# Patient Record
Sex: MALE | Race: BLACK OR AFRICAN AMERICAN | NOT HISPANIC OR LATINO | Employment: OTHER | URBAN - METROPOLITAN AREA
[De-identification: names, ages, dates, MRNs, and addresses within clinical notes are randomized per-mention and may not be internally consistent; named-entity substitution may affect disease eponyms.]

---

## 2017-02-03 ENCOUNTER — HOSPITAL ENCOUNTER (EMERGENCY)
Facility: HOSPITAL | Age: 52
Discharge: HOME | End: 2017-02-03
Attending: EMERGENCY MEDICINE
Payer: MEDICARE

## 2017-02-03 VITALS
BODY MASS INDEX: 30.75 KG/M2 | DIASTOLIC BLOOD PRESSURE: 83 MMHG | OXYGEN SATURATION: 100 % | SYSTOLIC BLOOD PRESSURE: 152 MMHG | WEIGHT: 227 LBS | RESPIRATION RATE: 19 BRPM | HEIGHT: 72 IN | TEMPERATURE: 98 F | HEART RATE: 73 BPM

## 2017-02-03 DIAGNOSIS — R53.1 WEAKNESS: ICD-10-CM

## 2017-02-03 DIAGNOSIS — R41.82 ALTERED MENTAL STATUS, UNSPECIFIED: Primary | ICD-10-CM

## 2017-02-03 LAB
ALBUMIN SERPL BCP-MCNC: 3.5 G/DL
ALP SERPL-CCNC: 59 U/L
ALT SERPL W/O P-5'-P-CCNC: 19 U/L
AMPHET+METHAMPHET UR QL: NEGATIVE
ANION GAP SERPL CALC-SCNC: 9 MMOL/L
AST SERPL-CCNC: 18 U/L
BARBITURATES UR QL SCN>200 NG/ML: NEGATIVE
BASOPHILS # BLD AUTO: 0.06 K/UL
BASOPHILS NFR BLD: 0.7 %
BENZODIAZ UR QL SCN>200 NG/ML: NEGATIVE
BILIRUB SERPL-MCNC: 0.4 MG/DL
BILIRUB UR QL STRIP: NEGATIVE
BNP SERPL-MCNC: <10 PG/ML
BUN SERPL-MCNC: 12 MG/DL
BZE UR QL SCN: NEGATIVE
CALCIUM SERPL-MCNC: 9.3 MG/DL
CANNABINOIDS UR QL SCN: NEGATIVE
CHLORIDE SERPL-SCNC: 104 MMOL/L
CLARITY UR REFRACT.AUTO: CLEAR
CO2 SERPL-SCNC: 27 MMOL/L
COLOR UR AUTO: YELLOW
CREAT SERPL-MCNC: 1.5 MG/DL
CREAT UR-MCNC: 213 MG/DL
DIFFERENTIAL METHOD: ABNORMAL
EOSINOPHIL # BLD AUTO: 0.1 K/UL
EOSINOPHIL NFR BLD: 0.6 %
ERYTHROCYTE [DISTWIDTH] IN BLOOD BY AUTOMATED COUNT: 15.9 %
EST. GFR  (AFRICAN AMERICAN): >60 ML/MIN/1.73 M^2
EST. GFR  (NON AFRICAN AMERICAN): 53.5 ML/MIN/1.73 M^2
ETHANOL SERPL-MCNC: <10 MG/DL
GLUCOSE SERPL-MCNC: 106 MG/DL
GLUCOSE UR QL STRIP: NEGATIVE
HCT VFR BLD AUTO: 38.7 %
HGB BLD-MCNC: 13.5 G/DL
HGB UR QL STRIP: NEGATIVE
INR PPP: 1
KETONES UR QL STRIP: NEGATIVE
LEUKOCYTE ESTERASE UR QL STRIP: NEGATIVE
LYMPHOCYTES # BLD AUTO: 1.8 K/UL
LYMPHOCYTES NFR BLD: 20.5 %
MCH RBC QN AUTO: 24.2 PG
MCHC RBC AUTO-ENTMCNC: 34.9 %
MCV RBC AUTO: 70 FL
METHADONE UR QL SCN>300 NG/ML: NEGATIVE
MONOCYTES # BLD AUTO: 0.6 K/UL
MONOCYTES NFR BLD: 6.6 %
NEUTROPHILS # BLD AUTO: 6.2 K/UL
NEUTROPHILS NFR BLD: 71.4 %
NITRITE UR QL STRIP: NEGATIVE
OPIATES UR QL SCN: NEGATIVE
PCP UR QL SCN>25 NG/ML: NEGATIVE
PH UR STRIP: 6 [PH] (ref 5–8)
PLATELET # BLD AUTO: 274 K/UL
PMV BLD AUTO: 10.1 FL
POCT GLUCOSE: 162 MG/DL (ref 70–110)
POTASSIUM SERPL-SCNC: 3.8 MMOL/L
PROT SERPL-MCNC: 7.3 G/DL
PROT UR QL STRIP: NEGATIVE
PROTHROMBIN TIME: 10.8 SEC
RBC # BLD AUTO: 5.57 M/UL
SODIUM SERPL-SCNC: 140 MMOL/L
SP GR UR STRIP: 1.02 (ref 1–1.03)
T4 FREE SERPL-MCNC: 0.97 NG/DL
TOXICOLOGY INFORMATION: NORMAL
TROPONIN I SERPL DL<=0.01 NG/ML-MCNC: 0.01 NG/ML
TSH SERPL DL<=0.005 MIU/L-ACNC: <0.01 UIU/ML
URN SPEC COLLECT METH UR: NORMAL
UROBILINOGEN UR STRIP-ACNC: 1 EU/DL
WBC # BLD AUTO: 8.65 K/UL

## 2017-02-03 PROCEDURE — 82962 GLUCOSE BLOOD TEST: CPT

## 2017-02-03 PROCEDURE — 82530 CORTISOL FREE: CPT

## 2017-02-03 PROCEDURE — 85610 PROTHROMBIN TIME: CPT

## 2017-02-03 PROCEDURE — 84439 ASSAY OF FREE THYROXINE: CPT

## 2017-02-03 PROCEDURE — 84484 ASSAY OF TROPONIN QUANT: CPT

## 2017-02-03 PROCEDURE — 80053 COMPREHEN METABOLIC PANEL: CPT

## 2017-02-03 PROCEDURE — 93005 ELECTROCARDIOGRAM TRACING: CPT

## 2017-02-03 PROCEDURE — 80320 DRUG SCREEN QUANTALCOHOLS: CPT

## 2017-02-03 PROCEDURE — 84443 ASSAY THYROID STIM HORMONE: CPT

## 2017-02-03 PROCEDURE — 83880 ASSAY OF NATRIURETIC PEPTIDE: CPT

## 2017-02-03 PROCEDURE — 99284 EMERGENCY DEPT VISIT MOD MDM: CPT | Mod: ,,, | Performed by: EMERGENCY MEDICINE

## 2017-02-03 PROCEDURE — 93010 ELECTROCARDIOGRAM REPORT: CPT | Mod: ,,, | Performed by: INTERNAL MEDICINE

## 2017-02-03 PROCEDURE — 96360 HYDRATION IV INFUSION INIT: CPT

## 2017-02-03 PROCEDURE — 25000003 PHARM REV CODE 250: Performed by: EMERGENCY MEDICINE

## 2017-02-03 PROCEDURE — 85025 COMPLETE CBC W/AUTO DIFF WBC: CPT

## 2017-02-03 PROCEDURE — 81003 URINALYSIS AUTO W/O SCOPE: CPT

## 2017-02-03 PROCEDURE — 82570 ASSAY OF URINE CREATININE: CPT

## 2017-02-03 PROCEDURE — 99284 EMERGENCY DEPT VISIT MOD MDM: CPT | Mod: 25

## 2017-02-03 RX ORDER — LEVOTHYROXINE SODIUM 25 UG/1
25 TABLET ORAL DAILY
COMMUNITY
End: 2017-08-16 | Stop reason: SDUPTHER

## 2017-02-03 RX ORDER — HYDROCORTISONE 5 MG/1
5 TABLET ORAL DAILY
COMMUNITY
End: 2017-08-16 | Stop reason: SDUPTHER

## 2017-02-03 RX ORDER — FLUOXETINE HYDROCHLORIDE 40 MG/1
40 CAPSULE ORAL DAILY
COMMUNITY
End: 2017-02-07 | Stop reason: CLARIF

## 2017-02-03 RX ORDER — QUETIAPINE FUMARATE 400 MG/1
400 TABLET, FILM COATED ORAL NIGHTLY
COMMUNITY

## 2017-02-03 RX ORDER — TESTOSTERONE CYPIONATE 200 MG/ML
INJECTION, SOLUTION INTRAMUSCULAR
COMMUNITY
End: 2017-06-20 | Stop reason: SDUPTHER

## 2017-02-03 RX ORDER — QUETIAPINE FUMARATE 200 MG/1
TABLET, FILM COATED ORAL
COMMUNITY
End: 2017-02-07 | Stop reason: SDUPTHER

## 2017-02-03 RX ORDER — OMEPRAZOLE 40 MG/1
40 CAPSULE, DELAYED RELEASE ORAL DAILY
COMMUNITY
End: 2017-02-05

## 2017-02-03 RX ORDER — LAMOTRIGINE 25 MG/1
25 TABLET ORAL DAILY
COMMUNITY
End: 2017-08-15 | Stop reason: SDUPTHER

## 2017-02-03 RX ORDER — SILDENAFIL 25 MG/1
25 TABLET, FILM COATED ORAL DAILY PRN
COMMUNITY
End: 2017-06-21

## 2017-02-03 RX ORDER — DESMOPRESSIN ACETATE 0.1 MG/1
50 TABLET ORAL 2 TIMES DAILY
COMMUNITY
End: 2017-08-15 | Stop reason: SDUPTHER

## 2017-02-03 RX ADMIN — SODIUM CHLORIDE 500 ML: 0.9 INJECTION, SOLUTION INTRAVENOUS at 02:02

## 2017-02-03 NOTE — ED PROVIDER NOTES
"Encounter Date: 2/3/2017    SCRIBE #1 NOTE: I, Magda Danyell, am scribing for, and in the presence of, Dr. Brian.       History     Chief Complaint   Patient presents with    Weakness     patient states that he do not feel right     Dizziness     Review of patient's allergies indicates:   Allergen Reactions    Sulfa (sulfonamide antibiotics) Hives     HPI Comments: Time seen by provider: 1:50 PM    This is a 51 y.o. male with a PMHx of thyroid disease and HTN who presents with complaint of "feeling out of it". The patient reports a history of adrenal problems and smoking. He states over the past 3 days he has been feeling strange, tired, and "like a zombie". The patient states he has been taking all of his medications as directed and that they have not been changed recently. He denies injury or trauma. He states he has had these symptoms in the past and received an stroke work up that was negative at that time. He reports worsening of these symptoms today which is what prompted his visit to the ED. He describes feeling weird and not entirely "there" at times, forgetting tasks that his doing of has recently done. He denies any focal or asymmetrical weakness.     The history is provided by the patient and the spouse.     Past Medical History   Diagnosis Date    Depression     Hypertension     Thyroid disease      No past medical history pertinent negatives.  Past Surgical History   Procedure Laterality Date    Pituitary surgery       History reviewed. No pertinent family history.  Social History   Substance Use Topics    Smoking status: None    Smokeless tobacco: None    Alcohol use None     Review of Systems   Constitutional: Negative for chills and fever.   HENT: Negative for congestion, dental problem, facial swelling and mouth sores.    Eyes: Negative for visual disturbance.   Respiratory: Negative for cough and shortness of breath.    Cardiovascular: Negative for chest pain and palpitations. "   Gastrointestinal: Negative for abdominal distention, abdominal pain, diarrhea, nausea and vomiting.   Genitourinary: Negative for difficulty urinating, dysuria, hematuria and urgency.   Musculoskeletal: Negative for neck pain and neck stiffness.   Skin: Negative for rash.   Neurological: Positive for weakness and light-headedness. Negative for seizures, syncope, facial asymmetry and speech difficulty.     All systems were reviewed and were negative except as noted in the HPI.    Physical Exam   Initial Vitals   BP Pulse Resp Temp SpO2   02/03/17 1243 02/03/17 1243 02/03/17 1243 02/03/17 1243 02/03/17 1243   192/104 87 18 98.4 °F (36.9 °C) 95 %       Physical Exam    Nursing note and vitals reviewed.  Constitutional: He appears well-developed and well-nourished. No distress.   HENT:   Head: Normocephalic.   Mouth/Throat: Oropharynx is clear and moist.   Eyes: Conjunctivae are normal.   Neck: Normal range of motion. Neck supple.   Cardiovascular: Normal rate, regular rhythm and intact distal pulses.   Pulmonary/Chest: Breath sounds normal. No respiratory distress. He has no wheezes. He has no rhonchi. He has no rales.   Abdominal: Soft. There is no tenderness.   Musculoskeletal: Normal range of motion.   Neurological: He is alert and oriented to person, place, and time. He has normal strength.   Skin: Skin is warm and dry.   Psychiatric: Thought content normal.     Neuro: awake, alert, oriented, CNs intact, fundi w/o papilledema, motor, sensory, and coordination intact in 4 extremities        ED Course   Procedures  Labs Reviewed   CBC W/ AUTO DIFFERENTIAL - Abnormal; Notable for the following:        Result Value    Hemoglobin 13.5 (*)     Hematocrit 38.7 (*)     MCV 70 (*)     MCH 24.2 (*)     RDW 15.9 (*)     All other components within normal limits    Narrative:     PLEASE REVIEW ORDER START TIME BEFORE MARKING SPECIMEN  COLLECTED.   COMPREHENSIVE METABOLIC PANEL - Abnormal; Notable for the following:      Creatinine 1.5 (*)     eGFR if non  53.5 (*)     All other components within normal limits    Narrative:     PLEASE REVIEW ORDER START TIME BEFORE MARKING SPECIMEN  COLLECTED.   TSH - Abnormal; Notable for the following:     TSH <0.010 (*)     All other components within normal limits    Narrative:     PLEASE REVIEW ORDER START TIME BEFORE MARKING SPECIMEN  COLLECTED.   POCT GLUCOSE - Abnormal; Notable for the following:     POCT Glucose 162 (*)     All other components within normal limits   PROTIME-INR    Narrative:     PLEASE REVIEW ORDER START TIME BEFORE MARKING SPECIMEN  COLLECTED.   TROPONIN I    Narrative:     PLEASE REVIEW ORDER START TIME BEFORE MARKING SPECIMEN  COLLECTED.   B-TYPE NATRIURETIC PEPTIDE    Narrative:     PLEASE REVIEW ORDER START TIME BEFORE MARKING SPECIMEN  COLLECTED.   ALCOHOL,MEDICAL (ETHANOL)    Narrative:     PLEASE REVIEW ORDER START TIME BEFORE MARKING SPECIMEN  COLLECTED.   URINALYSIS    Narrative:     1 cup of urine   DRUG SCREEN PANEL, URINE EMERGENCY    Narrative:     1 cup of urine   T4, FREE    Narrative:     PLEASE REVIEW ORDER START TIME BEFORE MARKING SPECIMEN  COLLECTED.   CORTISOL, FREE, SERUM   TROPONIN I   POCT GLUCOSE MONITORING CONTINUOUS     EKG Readings: (Independently Interpreted)   Heart Rate: 83.   Normal sinus rhythm. No obvious ischemia, QTC of 420       X-Rays:   Independently Interpreted Readings:   Chest X-Ray: No acute process.   Head CT: No obvious acute findings. There was question about a small area of induration by right frontal skull but he has no history of trauma.      Medical Decision Making:    I reviewed and interpreted the ECG and any monitoring strips.  I reviewed radiology personally along with interpretations.  I reviewed and interpreted the laboratory results.    Gerald Brian MD, LAMONTE, CPE, FACEP  Department of Emergency Medicine  , [employer] [employer]        Medical Decision  Making:   History:   Old Medical Records: I decided to obtain old medical records.  Initial Assessment:   The patient remained non focal on exam, he is speaking and walking normally and has no emergent indications for admission. He was given instructions to follow up with neurology and internal medicine and was give strict return precautions.   Independently Interpreted Test(s):   I have ordered and independently interpreted X-rays - see prior notes.  I have ordered and independently interpreted EKG Reading(s) - see prior notes  Clinical Tests:   Lab Tests: Ordered and Reviewed       <> Summary of Lab: Patient's labs: Glucose 162; TSH low; T4 normal; drug screen negative; UA not consistent with UTI; Alcohol negative; BNP normal; Troponin normal; INR normal; Creatinine 1.5, consistent with known history of mild renal insufficiency; CBC no critical findings.   Radiological Study: Ordered and Reviewed  Medical Tests: Ordered and Reviewed            Scribe Attestation:   Scribe #1: I performed the above scribed service and the documentation accurately describes the services I performed. I attest to the accuracy of the note.    Attending Attestation:           Physician Attestation for Scribe:  Physician Attestation Statement for Scribe #1: I, Dr. Brian, reviewed documentation, as scribed by Magda Child in my presence, and it is both accurate and complete.                 ED Course     Clinical Impression:   The primary encounter diagnosis was Altered mental status, unspecified. A diagnosis of Weakness was also pertinent to this visit.    Disposition:   Disposition: Discharged  Condition: Stable    Gerald Brian MD, LAMONTE, CPE, FACEP  Department of Emergency Medicine  , [employer] [employer]       Bryan Brian MD  02/04/17 8109

## 2017-02-03 NOTE — ED NOTES
LOC: The patient is awake and alert; oriented x 3 Feels week  APPEARANCE: Patient resting comfortably, patient is clean and well groomed  SKIN: warm and dry, normal skin turgor & moist mucus membranes, skin intact, no breakdown noted.  MUSCULOSKELETAL: Patient moving all extremities well, no obvious swelling or deformities noted  RESPIRATORY: Airway is open and patent, breath sounds clear throughout all lung fields; respirations are spontaneous, normal effort and rate  CARDIAC: Patient has a normal rate, no peripheral edema noted, capillary refill < 3 seconds; No complaints of chest pain   ABDOMEN: Soft and non tender to palpation, no distention noted.

## 2017-02-03 NOTE — DISCHARGE INSTRUCTIONS
Weakness (Uncertain Cause)  Normal head CT, EKG, Chest xray and blood and urine tests  Creatinine (1.5) kidney function was not significantly altered   Based on your exam today, the exact cause of your weakness is not certain. However, your weakness does not seem to be a sign of a serious illness at this time. Keep an eye on your symptoms and get medical advice as instructed below.  Home care  · Rest at home today. Do not over-exert yourself.  · Take any medicine as prescribed.  · For the next few days, drink extra fluids (unless your healthcare provider wants you to restrict fluids for other reasons). Do not skip meals.  Follow-up care  Follow up with your healthcare provider or as advised.  When to seek medical advice  Call your healthcare provider for any of the following  · Worsening of your symptoms  · Symptoms don't start getting better within 2 days  · Fever of 100.4º F (38º C) or higher, or as directed by your healthcare provider·    Call 911  Get emergency medical care for any of these:  · Chest, arm, neck, jaw or upper back pain  · Trouble breathing  · Numbness or weakness of the face, one arm or one leg  · Slurred speech, confusion, trouble speaking, walking or seeing  · Blood in vomit or stool (black or red color)  · Loss of consciousness  Date Last Reviewed: 6/10/2015  © 4838-3354 [employer]. [address] [place] [address], [place] [address]. All rights reserved. This information is not intended as a substitute for professional medical care. Always follow your healthcare professional's instructions.

## 2017-02-03 NOTE — ED AVS SNAPSHOT
[facility]  [address]  [facility] [address]               Alcon Marsh   2/3/2017  1:08 PM   ED    Description:  Male : 1965   Department:  [facility]           Your Care was Coordinated By:     Provider Role From To    Bryan Brian MD Attending Provider 17 1341 17 1350    Bryan Brian MD Attending Provider 17 1353 --    Marina Mcclelland PA-C Physician Assistant 17 1308 17 1350    Jose Elias Tovar MD Resident 17 1355 17 1405      Reason for Visit     Weakness     Dizziness           Diagnoses this Visit        Comments    Altered mental status, unspecified    -  Primary     Weakness           ED Disposition     None           To Do List           Follow-up Information     Schedule an appointment as soon as possible for a visit with Dwight Kishan - Internal Medicine.    Specialty:  Internal Medicine    Contact information:    [address]  [facility] [id number] [phone]    Additional information:    [facility] [address]        Schedule an appointment as soon as possible for a visit with Dwight Holley - Neurology.    Specialty:  Neurology    Contact information:    [address]  [facility] [address] [phone]    Additional information:    7th Floor - Clinic [facility]     [facility] - 24/7 Assistance  Registered nurses in the [facility] provide clinical advisement, health education, appointment booking, and other advisory services.  Call for this free service at [phone].             Medications           Message regarding Medications     Verify the changes and/or additions to your medication regime listed below are the same as discussed with your clinician today.  If any of these changes or additions are incorrect, please notify your healthcare provider.        These  medications were administered today        Dose Freq    sodium chloride 0.9% bolus 500 mL 500 mL Once    Sig: Inject 500 mLs into the vein once.    Class: Normal    Route: Intravenous           Verify that the below list of medications is an accurate representation of the medications you are currently taking.  If none reported, the list may be blank. If incorrect, please contact your healthcare provider. Carry this list with you in case of emergency.           Current Medications     desmopressin (DDAVP) 0.1 MG Tab Take 50 mcg by mouth 2 (two) times daily.    fluoxetine (PROZAC) 40 MG capsule Take 40 mg by mouth once daily.    hydrocortisone (CORTEF) 5 MG Tab Take 5 mg by mouth once daily.    lamotrigine (LAMICTAL) 25 MG tablet Take 25 mg by mouth once daily.    levothyroxine (SYNTHROID) 25 MCG tablet Take 25 mcg by mouth once daily.    omeprazole (PRILOSEC) 40 MG capsule Take 40 mg by mouth once daily.    quetiapine (SEROQUEL) 200 MG Tab Take by mouth.    quetiapine (SEROQUEL) 400 MG tablet Take 400 mg by mouth every evening.    sildenafil (VIAGRA) 25 MG tablet Take 25 mg by mouth daily as needed for Erectile Dysfunction.    testosterone cypionate (DEPOTESTOTERONE CYPIONATE) 200 mg/mL injection Inject into the muscle every 14 (fourteen) days.           Clinical Reference Information           Your Vitals Were     BP Pulse Temp Resp Height Weight    152/83 73 98.4 °F (36.9 °C) (Oral) 19 6' (1.829 m) 103 kg (227 lb)    SpO2 BMI             100% 30.79 kg/m2         Allergies as of 2/3/2017        Reactions    Sulfa (Sulfonamide Antibiotics) Hives      Immunizations Administered on Date of Encounter - 2/3/2017     None      ED Micro, Lab, POCT     Start Ordered       Status Ordering Provider    02/03/17 1352 02/03/17 1351  TSH  STAT      Final result     02/03/17 1351 02/03/17 1350  Ethanol  Once      Final result     02/03/17 1351 02/03/17 1350  Urinalysis Clean Catch  STAT      Final result     02/03/17 1351 02/03/17  1350  Drug screen panel, emergency  STAT      Final result     02/03/17 1351 02/03/17 1350  Cortisol, free, serum  Once      In process     02/03/17 1342 02/03/17 1341    Once,   Status:  Canceled      Canceled     02/03/17 1342 02/03/17 1341  POCT glucose  Once      Acknowledged     02/03/17 1341 02/03/17 1341  CBC auto differential  STAT      Final result     02/03/17 1341 02/03/17 1341  Comprehensive metabolic panel  STAT      Final result     02/03/17 1341 02/03/17 1341  Protime-INR  STAT      Final result     02/03/17 1341 02/03/17 1341  Troponin I  Now then every 3 hours     Comments:  PLEASE REVIEW ORDER START TIME BEFORE MARKING SPECIMEN COLLECTED.   Start Status   02/03/17 1341 Final result   02/03/17 1641 Acknowledged       Acknowledged     02/03/17 1341 02/03/17 1341  B-Type natriuretic peptide (BNP)  STAT      Final result     02/03/17 1341 02/03/17 1341  T4, free  Once      In process       ED Imaging Orders     Start Ordered       Status Ordering Provider    02/03/17 1342 02/03/17 1341  CT Head Without Contrast  1 time imaging      Final result     02/03/17 1341 02/03/17 1341  X-Ray Chest PA And Lateral  1 time imaging      Final result         Discharge Instructions         Weakness (Uncertain Cause)  Normal head CT, EKG, Chest xray and blood and urine tests  Creatinine (1.5) kidney function was not significantly altered   Based on your exam today, the exact cause of your weakness is not certain. However, your weakness does not seem to be a sign of a serious illness at this time. Keep an eye on your symptoms and get medical advice as instructed below.  Home care  · Rest at home today. Do not over-exert yourself.  · Take any medicine as prescribed.  · For the next few days, drink extra fluids (unless your healthcare provider wants you to restrict fluids for other reasons). Do not skip meals.  Follow-up care  Follow up with your healthcare provider or as advised.  When to seek medical advice  Call your  healthcare provider for any of the following  · Worsening of your symptoms  · Symptoms don't start getting better within 2 days  · Fever of 100.4º F (38º C) or higher, or as directed by your healthcare provider·    Call 911  Get emergency medical care for any of these:  · Chest, arm, neck, jaw or upper back pain  · Trouble breathing  · Numbness or weakness of the face, one arm or one leg  · Slurred speech, confusion, trouble speaking, walking or seeing  · Blood in vomit or stool (black or red color)  · Loss of consciousness  Date Last Reviewed: 6/10/2015  © 8337-0761 [employer]. [address] [place] [address], [place] [address]. All rights reserved. This information is not intended as a substitute for professional medical care. Always follow your healthcare professional's instructions.          [facility] Sign-Up     Activating your [facility] account is as easy as 1-2-3!     1) Visit [email or web address], select Sign Up Now, enter this activation code and your date of birth, then select Next.  [email or web address]  Expires: 3/20/2017  4:25 PM      2) Create a username and password to use when you visit [facility] in the future and select a security question in case you lose your password and select Next.    3) Enter your e-mail address and click Sign Up!    Additional Information  If you have questions, please e-mail [email or web address] or call [phone] to talk to our [facility] staff. Remember, [facility] is NOT to be used for urgent needs. For medical emergencies, dial 911.         Smoking Cessation     If you would like to quit smoking:   You may be eligible for free services if you are a [place] resident and started smoking cigarettes before September 1, 1988.  Call the Smoking Cessation Trust (SCT) toll free at (123) 827-1489 or (579) 450-2715.   Call 5-125-QUIT-NOW if you do not meet the above criteria.             [facility] complies with applicable Federal civil rights laws and does  not discriminate on the basis of race, color, national origin, age, disability, or sex.        Language Assistance Services     ATTENTION: Language assistance services are available, free of charge. Please call [phone].      ATENCIÓN: Si benedict gibson, tiene a lui disposición servicios gratuitos de asistencia lingüística. Llame al [phone].     CHÚ Ý: N?u b?n nói Ti?ng Vi?t, có các d?ch v? h? tr? ngôn ng? mi?n phí dành cho b?n. G?i s? [phone].

## 2017-02-03 NOTE — PROVIDER PROGRESS NOTES - EMERGENCY DEPT.
Encounter Date: 2/3/2017    ED Physician Progress Notes       SCRIBE NOTE: I, Magda Child, am scribing for, and in the presence of,  Dr. Brian.  Physician Statement: I, Dr. Brian, personally performed the services described in this documentation as scribed by Magda Child in my presence, and it is both accurate and complete.      EKG - STEMI Decision  Initial Reading: No STEMI present.

## 2017-02-03 NOTE — ED NOTES
"States has had 3 days of feeling bad.  Reports feeling like "I am not here".  Has adrenal insufficiency and states he was told to take 3 "hydrocortizones".    "

## 2017-02-05 ENCOUNTER — HOSPITAL ENCOUNTER (EMERGENCY)
Facility: HOSPITAL | Age: 52
Discharge: HOME | End: 2017-02-05
Attending: EMERGENCY MEDICINE
Payer: MEDICARE

## 2017-02-05 VITALS
HEART RATE: 84 BPM | SYSTOLIC BLOOD PRESSURE: 128 MMHG | BODY MASS INDEX: 30.75 KG/M2 | DIASTOLIC BLOOD PRESSURE: 71 MMHG | TEMPERATURE: 98 F | RESPIRATION RATE: 18 BRPM | OXYGEN SATURATION: 98 % | HEIGHT: 72 IN | WEIGHT: 227 LBS

## 2017-02-05 DIAGNOSIS — R53.1 GENERALIZED WEAKNESS: Primary | ICD-10-CM

## 2017-02-05 DIAGNOSIS — R53.81 MALAISE: ICD-10-CM

## 2017-02-05 LAB
BUN SERPL-MCNC: 10 MG/DL (ref 6–30)
CHLORIDE SERPL-SCNC: 105 MMOL/L (ref 95–110)
CREAT SERPL-MCNC: 1.6 MG/DL (ref 0.5–1.4)
GLUCOSE SERPL-MCNC: 126 MG/DL (ref 70–110)
HCT VFR BLD CALC: 41 %PCV (ref 36–54)
POC IONIZED CALCIUM: 1.17 MMOL/L (ref 1.06–1.42)
POC TCO2 (MEASURED): 25 MMOL/L (ref 23–29)
POTASSIUM BLD-SCNC: 3.5 MMOL/L (ref 3.5–5.1)
SAMPLE: ABNORMAL
SODIUM BLD-SCNC: 144 MMOL/L (ref 136–145)

## 2017-02-05 PROCEDURE — 99283 EMERGENCY DEPT VISIT LOW MDM: CPT

## 2017-02-05 PROCEDURE — 99283 EMERGENCY DEPT VISIT LOW MDM: CPT | Mod: ,,, | Performed by: EMERGENCY MEDICINE

## 2017-02-05 RX ORDER — OMEPRAZOLE 40 MG/1
40 CAPSULE, DELAYED RELEASE ORAL DAILY
Qty: 30 CAPSULE | Refills: 0 | Status: SHIPPED | OUTPATIENT
Start: 2017-02-05 | End: 2017-02-07 | Stop reason: SDUPTHER

## 2017-02-05 NOTE — ED PROVIDER NOTES
Encounter Date: 2/5/2017       History     Chief Complaint   Patient presents with    Altered Mental Status     SO stats that the patient is very weak and sligthly confused acting like a zombie. Symptoms started several nights ago. Pt was seen on the 2nd and evaluated for similar symptoms, and states that they have not worsened but have not gotten any better.  Pt awake and oriented to person and place, however delayed in asnwering questions.     Review of patient's allergies indicates:   Allergen Reactions    Sulfa (sulfonamide antibiotics) Hives     HPI Comments: Patient is a 52 y.o. Male with history of adrenal insuffiencey, hypothyroidism, depression and anxiety presents with generalized weakness, malaise, and hypersalivation x2 days. Patient says he doesn't feel like himself. He feels like he is producing a lot of saliva that is causing his speech to sound slurred. He denies numbness, focal weakness, dizziness, and dysequilibrium. No history of trauma. He says he has been compliant with home meds except Prilosec, Seroquel, Paxil, and Percocet.     Past Medical History   Diagnosis Date    Depression     Hypertension     Thyroid disease      No past medical history pertinent negatives.  Past Surgical History   Procedure Laterality Date    Pituitary surgery       No family history on file.  Social History   Substance Use Topics    Smoking status: Never Smoker    Smokeless tobacco: None    Alcohol use None     Review of Systems   Constitutional: Negative for fever.   HENT: Positive for drooling. Negative for sore throat.    Respiratory: Negative for shortness of breath.    Cardiovascular: Negative for chest pain.   Gastrointestinal: Negative for nausea.   Genitourinary: Negative for dysuria.   Musculoskeletal: Negative for back pain.   Skin: Negative for rash.   Neurological: Positive for weakness (generalized).   Hematological: Does not bruise/bleed easily.       Physical Exam   Initial Vitals   BP Pulse  Resp Temp SpO2   02/05/17 0020 02/05/17 0020 02/05/17 0020 02/05/17 0020 02/05/17 0020   128/71 84 18 98.4 °F (36.9 °C) 98 %     Physical Exam    Nursing note and vitals reviewed.  Constitutional: He appears well-developed and well-nourished. He is not diaphoretic. No distress.   HENT:   Head: Normocephalic and atraumatic.   Mouth/Throat: Oropharynx is clear and moist.   Eyes: Conjunctivae and EOM are normal. Pupils are equal, round, and reactive to light.   Neck: Normal range of motion. Neck supple.   Cardiovascular: Normal rate, regular rhythm, normal heart sounds and intact distal pulses. Exam reveals no gallop and no friction rub.    No murmur heard.  Pulmonary/Chest: Breath sounds normal. No stridor. No respiratory distress. He has no wheezes. He has no rhonchi. He has no rales.   Abdominal: Soft. Bowel sounds are normal. He exhibits no distension. There is no tenderness. There is no rebound and no guarding.   Musculoskeletal: He exhibits no edema or tenderness.   Neurological: He is alert and oriented to person, place, and time. He displays normal reflexes. No cranial nerve deficit or sensory deficit. He displays a negative Romberg sign. Coordination and gait normal. GCS eye subscore is 4. GCS verbal subscore is 5. GCS motor subscore is 6.   Sleeping but easily awakens to voice. Negative pronator drift. No deficits on finger to nose or rapid alternating hand movements. Gait steady; normal pace.   Skin: Skin is warm and dry.   Psychiatric: His speech is normal and behavior is normal. Thought content normal. His mood appears not anxious. His affect is not blunt and not labile. He is not agitated and not aggressive. He exhibits a depressed mood. He expresses no homicidal and no suicidal ideation. He expresses no suicidal plans and no homicidal plans.         ED Course   Procedures  Labs Reviewed   ISTAT PROCEDURE - Abnormal; Notable for the following:        Result Value    POC Glucose 126 (*)     POC Creatinine  1.6 (*)     All other components within normal limits                   APC / Resident Notes:   Patient is a 52 y.o. Male with history of depression, adrenal insufficiency and hypothyroidism presents with malaise and generalized weakness. DDX includes but not limited to adrenal crisis, myxedema coma, hypothyroidism, CVA, sepsis, depression. Chart review shows that patient has a thorough workup yesterday for same complaints which showed normal head CT, normal thyroid function, normal electrolytes, and normal white count. VS WNL and electrolytes WNL. Not consistent with adrenal crisis or hypothyroidism. No focal deficits indicative of CVA or signs of infection. Symptoms possible related to depression. He does not meet PEC criteria. Advised to continue all home meds and f/u with PCP. Given return precautions.    Akil Roth M.D.   [facility]         Attending Attestation:   Physician Attestation Statement for Resident:  As the supervising MD   Physician Attestation Statement: I have personally seen and examined this patient.   I agree with the above history. -:   As the supervising MD I agree with the above PE.    As the supervising MD I agree with the above treatment, course, plan, and disposition.  I have reviewed and agree with the residents interpretation of the following: lab data.  I have reviewed the following: old records at this facility.                    ED Course     Clinical Impression:   The primary encounter diagnosis was Generalized weakness. A diagnosis of Malaise was also pertinent to this visit.          Aikl Roth MD  Resident  02/08/17 2688       Bryan Manzanares MD  02/10/17 0130

## 2017-02-05 NOTE — ED AVS SNAPSHOT
[facility]  [address] [facility] [address] [id number]               Alcon Marsh   2017  2:00 AM   ED    Description:  Male : 1965   Department:  [facility]           Your Care was Coordinated By:     Provider Role From To    Bryan Manzanares MD Attending Provider 17 0238 --    Akil Roth MD Resident 17 0228 --      Reason for Visit     Altered Mental Status           Diagnoses this Visit        Comments    Generalized weakness    -  Primary     Malaise           ED Disposition     ED Disposition Condition Comment    Discharge             To Do List           Follow-up Information     Schedule an appointment as soon as possible for a visit with Rosa Elena Jackson MD.    Specialty:  Internal Medicine    Contact information:    [address] [address]  [phone]          [facility] with [facility].    Specialty:  Emergency Medicine    Why:  As needed, If symptoms worsen    Contact information:    [address] [facility] [id number] [phone]      [facility] On Call     [facility] Care [facility] -  Assistance  Registered nurses in the [facility] provide clinical advisement, health education, appointment booking, and other advisory services.  Call for this free service at [phone].             Medications           Message regarding Medications     Verify the changes and/or additions to your medication regime listed below are the same as discussed with your clinician today.  If any of these changes or additions are incorrect, please notify your healthcare provider.             Verify that the below list of medications is an accurate representation of the medications you are currently taking.  If none reported, the list may be blank. If incorrect, please contact your healthcare provider. Carry this list with you in case of emergency.           Current  Medications     desmopressin (DDAVP) 0.1 MG Tab Take 50 mcg by mouth 2 (two) times daily.    fluoxetine (PROZAC) 40 MG capsule Take 40 mg by mouth once daily.    hydrocortisone (CORTEF) 5 MG Tab Take 5 mg by mouth once daily.    lamotrigine (LAMICTAL) 25 MG tablet Take 25 mg by mouth once daily.    levothyroxine (SYNTHROID) 25 MCG tablet Take 25 mcg by mouth once daily.    omeprazole (PRILOSEC) 40 MG capsule Take 40 mg by mouth once daily.    quetiapine (SEROQUEL) 200 MG Tab Take by mouth.    quetiapine (SEROQUEL) 400 MG tablet Take 400 mg by mouth every evening.    sildenafil (VIAGRA) 25 MG tablet Take 25 mg by mouth daily as needed for Erectile Dysfunction.    testosterone cypionate (DEPOTESTOTERONE CYPIONATE) 200 mg/mL injection Inject into the muscle every 14 (fourteen) days.           Clinical Reference Information           Your Vitals Were     BP Pulse Temp Resp Height Weight    128/71 84 98.4 °F (36.9 °C) 18 6' (1.829 m) 103 kg (227 lb)    SpO2 BMI             98% 30.79 kg/m2         Allergies as of 2/5/2017        Reactions    Sulfa (Sulfonamide Antibiotics) Hives      Immunizations Administered on Date of Encounter - 2/5/2017     None      ED Micro, Lab, POCT     Start Ordered       Status Ordering Provider    02/05/17 0407 02/05/17 0407  ISTAT PROCEDURE  Once      Final result     02/05/17 0355 02/05/17 0354  ISTAT CHEM8  Once      Acknowledged       ED Imaging Orders     None        Discharge Instructions         Confusion  Confusion is a change in a persons ability to think clearly. There may be trouble recognizing familiar people and places or knowing what day it is. Memory, judgment, and decision-making may also be affected. In severe cases, the person may have limited or no response to being spoken to.  Confusion is usually a sign of an underlying problem. It may occur suddenly. Or it may develop gradually over time. Causes of confusion include brain injury, medicines, alcohol, withdrawal from  certain medicines or illegal drugs, and infection. Heart attack and stroke may cause it. Confusion can also be a sign of dementia or a mental illness.  Treatment will depend on the cause of the problem. If the issue is a medicine, stopping the medicine may help. The healthcare provider will perform an evaluation and certain tests may be done. Follow up with the healthcare provider for the results.  Home care  · Be sure someone is with the confused person at all times. He or she should not be left alone or unsupervised.  · Tell the healthcare provider about all medicines that the person takes. These include prescription, over-the-counter, herbs, and supplements.  · Dehydration can increase confusion. Ask the healthcare provider how much fluid the person should be drinking. Offer liquids and ensure that they are taken.  · Keep all medicines in a secure place under the caregivers control. To prevent overdose, a confused person should take medicines only under the supervision of a caregiver.  · To help a person with confusion:  ¨ Establish a daily routine. Change can be a source of stress for someone with confusion. Make and keep a time schedule for common tasks such as bathing, dressing, taking medicines, meals, going for walks, shopping, naps and bed time.  ¨ Speak slowly and clearly with a gentle tone of voice. Use short simple words and sentences. Ask one question at a time. Do not interrupt, criticize or argue. Be calm and supportive. Use friendly facial expressions. Use pointing and touching to help communicate. If there has been loss of long-term memory, do not ask questions about past events. This would only cause frustration for the person.  ¨ Use lists, signs, family photos, clocks and calendars as memory aids. Label cabinets and drawers. Try to distract, not confront, the patient. When he/she becomes frustrated or upset, redirect his/her attention to eating or some other activity of interest.  ¨ If this  proves to be due to a permanent condition, talk to the healthcare provider or a  about getting a Power of  for healthcare and for financial decisions. It is best to do this while the person can still sign legal documents and make his or her own decisions. Otherwise, a court order will be required.  Follow-up care  Follow up with the person's healthcare provider or as advised for further testing or changes in medical care.  When to seek medical advice  Call the healthcare provider for any of the following:  · Frequent falling  · Refusal to eat or drink  · Violent behavior or behavior too difficult to manage at home  · New hallucinations or delusions  · Increased drowsiness  · Complaints of headache or numbness or weakness of the face, arm or leg  · Nausea or vomiting  · Slurred speech or trouble speaking, walking, or seeing  · Fainting spell, dizziness or seizure  · Unexplained fever over 100.4º F (38.0º C) or as directed by the healthcare provider  Date Last Reviewed: 8/23/2015  © 0193-2548 [employer]. [address] [place] [address]. All rights reserved. This information is not intended as a substitute for professional medical care. Always follow your healthcare professional's instructions.          [facility] Sign-Up     Activating your [facility] account is as easy as 1-2-3!     1) Visit [email or web address], select Sign Up Now, enter this activation code and your date of birth, then select Next.  [email or web address]  Expires: 3/20/2017  4:25 PM      2) Create a username and password to use when you visit [facility] in the future and select a security question in case you lose your password and select Next.    3) Enter your e-mail address and click Sign Up!    Additional Information  If you have questions, please e-mail [email or web address] or call [phone] to talk to our [facility] staff. Remember, [facility] is NOT to be used for urgent needs. For medical emergencies, dial 911.           [facility] complies with applicable Federal civil rights laws and does not discriminate on the basis of race, color, national origin, age, disability, or sex.        Language Assistance Services     ATTENTION: Language assistance services are available, free of charge. Please call [phone].      ATENCIÓN: Si habla arthur, tiene a lui disposición servicios gratuitos de asistencia lingüística. Llame al [phone].     CHÚ Ý: N?u b?n nói Ti?ng Vi?t, có các d?ch v? h? tr? ngôn ng? mi?n phí dành cho b?n. G?i s? [phone].

## 2017-02-05 NOTE — DISCHARGE INSTRUCTIONS
Confusion  Confusion is a change in a persons ability to think clearly. There may be trouble recognizing familiar people and places or knowing what day it is. Memory, judgment, and decision-making may also be affected. In severe cases, the person may have limited or no response to being spoken to.  Confusion is usually a sign of an underlying problem. It may occur suddenly. Or it may develop gradually over time. Causes of confusion include brain injury, medicines, alcohol, withdrawal from certain medicines or illegal drugs, and infection. Heart attack and stroke may cause it. Confusion can also be a sign of dementia or a mental illness.  Treatment will depend on the cause of the problem. If the issue is a medicine, stopping the medicine may help. The healthcare provider will perform an evaluation and certain tests may be done. Follow up with the healthcare provider for the results.  Home care  · Be sure someone is with the confused person at all times. He or she should not be left alone or unsupervised.  · Tell the healthcare provider about all medicines that the person takes. These include prescription, over-the-counter, herbs, and supplements.  · Dehydration can increase confusion. Ask the healthcare provider how much fluid the person should be drinking. Offer liquids and ensure that they are taken.  · Keep all medicines in a secure place under the caregivers control. To prevent overdose, a confused person should take medicines only under the supervision of a caregiver.  · To help a person with confusion:  ¨ Establish a daily routine. Change can be a source of stress for someone with confusion. Make and keep a time schedule for common tasks such as bathing, dressing, taking medicines, meals, going for walks, shopping, naps and bed time.  ¨ Speak slowly and clearly with a gentle tone of voice. Use short simple words and sentences. Ask one question at a time. Do not interrupt, criticize or argue. Be calm and  supportive. Use friendly facial expressions. Use pointing and touching to help communicate. If there has been loss of long-term memory, do not ask questions about past events. This would only cause frustration for the person.  ¨ Use lists, signs, family photos, clocks and calendars as memory aids. Label cabinets and drawers. Try to distract, not confront, the patient. When he/she becomes frustrated or upset, redirect his/her attention to eating or some other activity of interest.  ¨ If this proves to be due to a permanent condition, talk to the healthcare provider or a  about getting a Power of  for healthcare and for financial decisions. It is best to do this while the person can still sign legal documents and make his or her own decisions. Otherwise, a court order will be required.  Follow-up care  Follow up with the person's healthcare provider or as advised for further testing or changes in medical care.  When to seek medical advice  Call the healthcare provider for any of the following:  · Frequent falling  · Refusal to eat or drink  · Violent behavior or behavior too difficult to manage at home  · New hallucinations or delusions  · Increased drowsiness  · Complaints of headache or numbness or weakness of the face, arm or leg  · Nausea or vomiting  · Slurred speech or trouble speaking, walking, or seeing  · Fainting spell, dizziness or seizure  · Unexplained fever over 100.4º F (38.0º C) or as directed by the healthcare provider  Date Last Reviewed: 8/23/2015 © 2000-2016 [employer]. [address] [place] [address], [place] [address]. All rights reserved. This information is not intended as a substitute for professional medical care. Always follow your healthcare professional's instructions.

## 2017-02-07 ENCOUNTER — OFFICE VISIT (OUTPATIENT)
Dept: INTERNAL MEDICINE | Facility: CLINIC | Age: 52
End: 2017-02-07
Payer: MEDICARE

## 2017-02-07 VITALS
WEIGHT: 227.31 LBS | HEART RATE: 86 BPM | DIASTOLIC BLOOD PRESSURE: 92 MMHG | SYSTOLIC BLOOD PRESSURE: 145 MMHG | BODY MASS INDEX: 30.79 KG/M2 | HEIGHT: 72 IN

## 2017-02-07 DIAGNOSIS — F32.A DEPRESSION, UNSPECIFIED DEPRESSION TYPE: Chronic | ICD-10-CM

## 2017-02-07 DIAGNOSIS — R47.1 DYSARTHRIA: Primary | ICD-10-CM

## 2017-02-07 DIAGNOSIS — E23.0 PANHYPOPITUITARISM: Chronic | ICD-10-CM

## 2017-02-07 DIAGNOSIS — N18.3 CKD (CHRONIC KIDNEY DISEASE), STAGE 3 (MODERATE): ICD-10-CM

## 2017-02-07 DIAGNOSIS — I10 ESSENTIAL HYPERTENSION, BENIGN: ICD-10-CM

## 2017-02-07 PROBLEM — N18.9 CKD (CHRONIC KIDNEY DISEASE): Status: ACTIVE | Noted: 2017-02-07

## 2017-02-07 PROCEDURE — 99999 PR PBB SHADOW E&M-EST. PATIENT-LVL III: CPT | Mod: PBBFAC,,, | Performed by: INTERNAL MEDICINE

## 2017-02-07 PROCEDURE — 99203 OFFICE O/P NEW LOW 30 MIN: CPT | Mod: S$PBB,,, | Performed by: INTERNAL MEDICINE

## 2017-02-07 RX ORDER — OMEPRAZOLE 40 MG/1
40 CAPSULE, DELAYED RELEASE ORAL DAILY
Qty: 30 CAPSULE | Refills: 2 | Status: SHIPPED | OUTPATIENT
Start: 2017-02-07 | End: 2017-08-16 | Stop reason: SDUPTHER

## 2017-02-07 RX ORDER — PAROXETINE HYDROCHLORIDE 40 MG/1
40 TABLET, FILM COATED ORAL EVERY MORNING
Qty: 30 TABLET | Refills: 2 | Status: SHIPPED | OUTPATIENT
Start: 2017-02-07 | End: 2018-02-07

## 2017-02-07 RX ORDER — QUETIAPINE FUMARATE 400 MG/1
400 TABLET, FILM COATED ORAL NIGHTLY
Qty: 30 TABLET | Refills: 2 | Status: CANCELLED | OUTPATIENT
Start: 2017-02-07

## 2017-02-07 NOTE — PROGRESS NOTES
"Subjective:       Patient ID: Magdy Marsh is a 52 y.o. male.    Chief Complaint: Follow-up (ER f/u)    HPI   This is a 51 y.o. 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 Previously presented to the ED with complaint of "feeling out of it". He stated over the past 3 days he has been feeling strange, tired, and "like a zombie". The patient states he has been taking all of his medications as directed and that they have not been changed recently. He denies injury or trauma. He states he has had these symptoms in the past and received an stroke work up that was negative at that time. He reports worsening of these symptoms today which is what prompted his visit to the ED. He describes feeling weird and not entirely "there" at times, forgetting tasks that his doing of has recently done. He denies any focal or asymmetrical weakness.     Chart review shows that patient has a thorough workup 02/03 which showed normal head CT, normal free T4, normal electrolytes, and normal white count. VS WNL and electrolytes WNL. Not consistent with adrenal crisis or hypothyroidism. No focal deficits indicative of CVA or signs of infection.     Reports that he's having problems drooling and slurring of speech. Feels like his tung is swollen in last two days and is having problems swallowing though was able to eat fried chicken without issue. Drank water and did not feel like it got stuck- only that his tongue had difficulty moving. Feels that this is related to having oral sex a week ago from a new partner. He's visiting from [place] until tomorrow. This has never happened before. Has not taken any new meds or eaten any new foods. Feels fatigued, increased sleeping, decreased appetite and has new anxiety over how he's feeling.     Review of Systems   Constitutional: Positive for appetite change (decreased appetite). Negative for chills and fever.   HENT: " Positive for drooling and trouble swallowing. Negative for dental problem, facial swelling, hearing loss, mouth sores, sinus pressure, sore throat and voice change.         New snoring, no numbness of tongue   Eyes: Negative for visual disturbance.   Respiratory: Positive for cough, shortness of breath and wheezing. Negative for choking.    Cardiovascular: Negative for chest pain.   Gastrointestinal: Negative for abdominal pain.   Genitourinary: Negative for dysuria.   Musculoskeletal: Negative for back pain and joint swelling.   Skin: Negative for rash.   Psychiatric/Behavioral: Positive for confusion and sleep disturbance (increased sleeping). Negative for dysphoric mood. The patient is nervous/anxious.        Objective:      Physical Exam   Constitutional: He is oriented to person, place, and time. He appears well-developed and well-nourished.   HENT:   Head: Normocephalic and atraumatic.   Mouth/Throat: Oropharynx is clear and moist. No oropharyngeal exudate.   No appreciable swelling of tongue or face. Moist mucosa- extra secretions   Eyes: EOM are normal. Pupils are equal, round, and reactive to light.   Neck: Normal range of motion. Neck supple. No thyromegaly present.   Cardiovascular: Normal rate and regular rhythm.    No murmur heard.  Pulmonary/Chest: Effort normal and breath sounds normal. No respiratory distress. He has no rales.   Abdominal: Soft. Bowel sounds are normal.   Musculoskeletal: Normal range of motion. He exhibits no edema or tenderness.   Neurological: He is alert and oriented to person, place, and time.   Skin: Skin is warm and dry.   Psychiatric: His behavior is normal. Judgment and thought content normal.   Flat affect. Psychomotor slowing   Vitals reviewed.      Assessment:       1. Dysarthria    2. Depression, unspecified depression type    3. Panhypopituitarism    4. CKD (chronic kidney disease), stage 3 (moderate)    5. Essential hypertension, benign        Has had thorough  evaluation in the ED which has ruled out acute abnormalities like stroke given normal CT head. Electrolytes wnl. VSS. Pending cortisol.   Plan:   1. Limited work up given that patient is planning on leaving tomorrow  2. Instructed to go to the ED if he is actually unable to swallow food or becomes short of breath  3. Instructed him to bring records from his doctors in [place] if he wishes to pursue further evaluation here  4. Given his issue with swallowing- would consider neurology vs GI in [place] evaluation if this persists   5. If stays here could get an MRI of brain  6. Stick to soft foods

## 2017-02-08 NOTE — PROGRESS NOTES
Preceptor note    Patient's history and physical discussed, please refer to resident physician's note for specific details. Pt seen  with resident physician. Medical record reviewed. Patient's primary complaint is changes in his speech and pooling of saliva in his mouth. He denies any difficulty swallowing solids or liquids. He denies any facial paresthesia, changes in vision, hearing, taste or smell. He denies any changes in motor strength and denies all other symptoms. He denies any changes in his medications. Head CT reviewed and read as negative by radiology. Labs fairly unremarkable.  I agree with resident's assessment and plan. Patient states he has a neurologist, nephrologist, and PCP in [place]. He had plans for next day departure so no new consults were ordered this visit. Patient advised to follow up in [place] and given warning signs of when he need to go back to the ER.

## 2017-02-10 LAB — CORTIS F SERPL-MCNC: 1.29 MCG/DL

## 2017-06-07 ENCOUNTER — TELEPHONE (OUTPATIENT)
Dept: NEPHROLOGY | Facility: CLINIC | Age: 52
End: 2017-06-07

## 2017-06-07 NOTE — TELEPHONE ENCOUNTER
----- Message from Marcia Bates sent at 6/7/2017  3:07 PM CDT -----  Contact: pt  NP  Pt  appt  6/26      Relocated from [place]   -    Referring self    Said  in [place] said he needed to see nephrologist    ckd 3     No history in system     Do you  Want labs?    Thanks

## 2017-06-08 ENCOUNTER — OFFICE VISIT (OUTPATIENT)
Dept: NEUROLOGY | Facility: CLINIC | Age: 52
End: 2017-06-08
Payer: MEDICARE

## 2017-06-08 VITALS
HEIGHT: 72 IN | WEIGHT: 217.13 LBS | SYSTOLIC BLOOD PRESSURE: 116 MMHG | DIASTOLIC BLOOD PRESSURE: 75 MMHG | BODY MASS INDEX: 29.41 KG/M2 | HEART RATE: 71 BPM

## 2017-06-08 DIAGNOSIS — I10 ESSENTIAL HYPERTENSION: ICD-10-CM

## 2017-06-08 DIAGNOSIS — N18.3 CKD (CHRONIC KIDNEY DISEASE), STAGE 3 (MODERATE): ICD-10-CM

## 2017-06-08 DIAGNOSIS — Z87.438 HISTORY OF PRIAPISM: ICD-10-CM

## 2017-06-08 DIAGNOSIS — Z86.73 HISTORY OF STROKE: Primary | ICD-10-CM

## 2017-06-08 DIAGNOSIS — G47.33 OSA (OBSTRUCTIVE SLEEP APNEA): ICD-10-CM

## 2017-06-08 DIAGNOSIS — D57.3 SICKLE-CELL TRAIT: ICD-10-CM

## 2017-06-08 DIAGNOSIS — E23.0 PANHYPOPITUITARISM: ICD-10-CM

## 2017-06-08 DIAGNOSIS — Z87.898 HISTORY OF PITUITARY NEOPLASM: ICD-10-CM

## 2017-06-08 PROCEDURE — 99999 PR PBB SHADOW E&M-EST. PATIENT-LVL III: CPT | Mod: PBBFAC,,, | Performed by: PHYSICIAN ASSISTANT

## 2017-06-08 PROCEDURE — 99204 OFFICE O/P NEW MOD 45 MIN: CPT | Mod: S$PBB,,, | Performed by: PHYSICIAN ASSISTANT

## 2017-06-08 NOTE — PROGRESS NOTES
[facility], Department of Neurology   [address] [place] [address]  Phone:[phone]  Fax: [phone]    Patient name: Magdy Marsh  : 1965  MRN: [id number]    2017      Chief complaint: stroke f/u   Chief Complaint   Patient presents with    Consult       PCP: Rosa Elena Jackson MD    Assessment:     ICD-10-CM ICD-9-CM    1. History of stroke Z86.73 V12.54    2. History of pituitary neoplasm Z87.898 V12.29 Ambulatory Referral to Neurosurgery   3. 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 CKD (chronic kidney disease), stage 3 (moderate) N18.3 585.3    5. Essential hypertension I10 401.9    6. MAME (obstructive sleep apnea) G47.33 327.23    7. History of priapism Z87.438 V13.29    8. Sickle-cell trait D57.3 282.5 Ambulatory consult to Hematology / Oncology       Dx: hx CVA , unsure etiology at this time- awaiting records   Stroke risk factors: HTN, prediabetes, sickle cell trait, former smoker (quit 3/17)    Plan:  -continue Xarelto, ASA, Atorvastatin until further review of outside records   -would recommend he see hematology in regards to sickle cell trait   -NSGY referral, may need more recent MRI brain   -pt in process of obtaining old records, which I will review   -Continue f/u with PCP regarding stroke risk factor modification as outlined below and discussed with patient.   -RTC 3 mos     Stroke education: Continue to address with PCP these risk factor guidelines: SBP<130, FBS<100, LDL<70 mg/dL, antiplatelet. Continue exercise as tolerated, avoid tobacco, abstain from ETOH (<3 bevs optimal a week), stay well hydrated, avoid PO intake of NaCl, regular sleep. Will have patient continue to address this with PCP. Discussed CVA symptoms with patient at length, etiology of CVA and need to remain compliant on all medications. Mentioned that medication is preventative however nothing is absolute. Robust recovery first 4-6 months up to a year for  "noticeable improvement. If symptomatic, will need to report to ED in <2h for prompt eval. Patient voiced understanding.  All questions answered. The patient indicates understanding of these issues and agrees to the plan.    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     Pt seen at [facility] ED on 2/3/17 and 2/5/17 for symptoms of feeling tired, "feeling out of it," "like a zombie." pt states also had slurred speech and left sided weakness and paresthesias. He had negative CT head and unremarkable labs. No focal deficits on exam. Shortly after leaving ED here, was traveling via bus to [place]. He notes worsening of symptoms- left sided weakness/paresthesias, slurred speech- on the way to [facility]. He had a MRI and CT scan revealing CVA. He was admitted for 4 days.     I do not have records or imaged from this admission. But patient reports being told the etiology of his stroke was due to sickle cell trait, which is a new diagnosis for him. Unsure the location of CVA. He is now on Xarelto, ASA 81, and Atorvastatin 40 mg qd-- these are all new since the recent CVA. Did not require therapy post-stroke. He notes he is back to his baseline. Former smoker, stopped 3/2017.     Hx pituitary tumor excision: Has been overall doing ok since tumor removal. Has bad days consisting of nausea, mood swings. When this occurs he does what he calls "3, 3, 3" triples up on medication, three times daily, x three days. He was followed by many specialties in [facility] including Neurosurgery. Typically gets MRI brain yearly, due for next MRI brain 12/2017.     Has upcoming Urology (hx priapism, drained, no on-going issues), Nephrology (CKD III), Sleep med (MAME, had sleep study in [facility]), Endocrine, and primary care appointments scheduled. Requests NSGY referral.     "   Cerebrovascular history:   See above    Medications:   Current Outpatient Prescriptions   Medication Sig Dispense Refill    desmopressin (DDAVP) 0.1 MG Tab Take 50 mcg by mouth 2 (two) times daily.      hydrocortisone (CORTEF) 5 MG Tab Take 5 mg by mouth once daily.      lamotrigine (LAMICTAL) 25 MG tablet Take 25 mg by mouth once daily.      levothyroxine (SYNTHROID) 25 MCG tablet Take 25 mcg by mouth once daily.      omeprazole (PRILOSEC) 40 MG capsule Take 1 capsule (40 mg total) by mouth once daily. 30 capsule 2    paroxetine (PAXIL) 40 MG tablet Take 1 tablet (40 mg total) by mouth every morning. 30 tablet 2    quetiapine (SEROQUEL) 400 MG tablet Take 400 mg by mouth every evening.      sildenafil (VIAGRA) 25 MG tablet Take 25 mg by mouth daily as needed for Erectile Dysfunction.      testosterone cypionate (DEPOTESTOTERONE CYPIONATE) 200 mg/mL injection Inject into the muscle every 14 (fourteen) days.       No current facility-administered medications for this visit.        Allergies:  Review of patient's allergies indicates:   Allergen Reactions    Sulfa (sulfonamide antibiotics) Hives       PMHx:  Past Medical History:   Diagnosis Date    Adrenal insufficiency     BPH (benign prostatic hyperplasia)     CKD (chronic kidney disease), stage III     Depression     Hypertension     Impaired fasting glucose     Low testosterone level in male     Thyroid disease      Past Surgical History:   Procedure Laterality Date    PITUITARY SURGERY         Fhx:  History reviewed. No pertinent family history.    Shx:   Social History     Social History    Marital status: Single     Spouse name: N/A    Number of children: N/A    Years of education: N/A     Occupational History    Disabled      Social History Main Topics    Smoking status: Former Smoker     Packs/day: 0.15     Years: 15.00    Smokeless tobacco: Not on file    Alcohol use No    Drug use: Unknown    Sexual activity: Yes     Partners:  Female     Other Topics Concern    Not on file     Social History Narrative    No narrative on file       Labs:     Imaging:  CT head 2/3/17  Impression      Subtle soft tissue induration overlies the right inferior frontal calvarium which may be sequela of traumatic injury, clinical correlation advised. No evidence for underlying fracture.    Otherwise unremarkable noncontrast CT head specifically without evidence for acute intracranial hemorrhage. Further evaluation as warranted clinically.         ROS:   Review Of Systems (questions asked, positive or additions in BOLD)  Gen: Weight change, fatigue/malaise, pyrexia   HEENT: Tinnitus, headache,  blurred vision, eye pain, diplopia, photophobia   Card: Palpitations, CP   Pulm: SOB   Vas: Easy bruising, easy bleeding   GI: N/V/D/C, incontinence, hematemesis, hematochezia    : incontinence, hematuria   Endocrine: Temp intolerance, polyuria, polydipsia   M/S: Neck pain, myalgia, back pain, joint pain, falls    Neuro: PER HPI   PSY: Memory loss, confusion, depression, anxiety, trouble in sleep      Physical Exam:  /75   Pulse 71   Ht 6' (1.829 m)   Wt 98.5 kg (217 lb 2.5 oz)   BMI 29.45 kg/m²     Well developed, well nourished male  Extremities: no edema    NIH Stroke Scale:  Level of Consciousness: 0 - alert  LOC Questions: 0 - answers both correctly  LOC Commands: 0 - performs both correctly  Best Gaze: 0 - normal  Visual: 0 - no visual loss  Facial Palsy: 0 - normal  Motor Left Arm: 0 - no drift  Motor Right Arm: 0 - no drift  Motor Left Le - no drift  Motor Right Le - no drift  Limb Ataxia: 0 - absent  Sensory: 0 - normal  Best Language: 0 - no aphasia  Dysarthria: 0 - normal articulation  Extinction and Inattention: 0 - no neglect    NIH: 0  Mental status:                        Awake, alert and appropriately oriented                        Normal recent and remote memory                        Normal attention and concentration                         Normal speech and language                        Normal fund of knowledge                        No extinction  Cranial nerves:                        PERRLA                        EOMF without nystagmus                        VFF                        Normal facial sensation                        Normal facial movements                        Intact hearing bilaterally                        Palate elevates symmetrically                        Normal SCM and trapezius strength                        Tongue midline  Motor:                        No pronator drift                        Normal FF movements bilaterally                        Normal muscle tone, bulk and power                        No abnormal movements  Sensory                        Intact to LT  DTRs                        2+ and symmetric  Coordination                        Intact to FNF and HTS  Gait                        Normal base and gait        Attending, Dr. Almaguer, was available during today's encounter. Any change to plan along with cosign to appear in the EMR.       This document has been electronically signed by Natividad Rader PA-C on 6/13/2017, 9:07 AM. I have personally typed this message using the EMR.       Natividad Rader PA-C  Department of Neurology   [facility] [place], [place]

## 2017-06-13 PROBLEM — Z86.73 HISTORY OF STROKE: Status: ACTIVE | Noted: 2017-06-13

## 2017-06-13 PROBLEM — I10 ESSENTIAL HYPERTENSION: Status: ACTIVE | Noted: 2017-06-13

## 2017-06-13 PROBLEM — Z87.438 HISTORY OF PRIAPISM: Status: ACTIVE | Noted: 2017-06-13

## 2017-06-13 PROBLEM — G47.33 OSA (OBSTRUCTIVE SLEEP APNEA): Status: ACTIVE | Noted: 2017-06-13

## 2017-06-13 PROBLEM — Z87.898 HISTORY OF PITUITARY NEOPLASM: Status: ACTIVE | Noted: 2017-06-13

## 2017-06-13 PROBLEM — D57.3 SICKLE-CELL TRAIT: Status: ACTIVE | Noted: 2017-06-13

## 2017-06-20 ENCOUNTER — OFFICE VISIT (OUTPATIENT)
Dept: ENDOCRINOLOGY | Facility: CLINIC | Age: 52
End: 2017-06-20
Payer: MEDICARE

## 2017-06-20 VITALS
SYSTOLIC BLOOD PRESSURE: 102 MMHG | DIASTOLIC BLOOD PRESSURE: 70 MMHG | WEIGHT: 218 LBS | HEIGHT: 72 IN | HEART RATE: 88 BPM | BODY MASS INDEX: 29.53 KG/M2

## 2017-06-20 DIAGNOSIS — Z87.898 HISTORY OF PITUITARY NEOPLASM: ICD-10-CM

## 2017-06-20 DIAGNOSIS — E23.0 PANHYPOPITUITARISM: Primary | Chronic | ICD-10-CM

## 2017-06-20 PROCEDURE — 99204 OFFICE O/P NEW MOD 45 MIN: CPT | Mod: S$GLB,,, | Performed by: INTERNAL MEDICINE

## 2017-06-20 PROCEDURE — 99999 PR PBB SHADOW E&M-EST. PATIENT-LVL III: CPT | Mod: PBBFAC,,, | Performed by: INTERNAL MEDICINE

## 2017-06-20 RX ORDER — TESTOSTERONE CYPIONATE 200 MG/ML
200 INJECTION, SOLUTION INTRAMUSCULAR
Qty: 10 ML | Refills: 4 | Status: SHIPPED | OUTPATIENT
Start: 2017-06-20 | End: 2017-08-15 | Stop reason: SDUPTHER

## 2017-06-20 RX ORDER — SYRINGE, DISPOSABLE, 3 ML
SYRINGE, EMPTY DISPOSABLE MISCELLANEOUS
Qty: 25 EACH | Refills: 11 | Status: SHIPPED | OUTPATIENT
Start: 2017-06-20 | End: 2017-08-15 | Stop reason: SDUPTHER

## 2017-06-20 NOTE — PROGRESS NOTES
Subjective:     Patient ID: Magdy Marsh is a 52 y.o. male.    Chief Complaint: Hypogonadism    HPI:   Mr. Marsh is a 52 y.o. male who is here for a consult visit for evaluation hypopituitarism following pituitary resection of a pituitary tumor.   Four years ago presented with acute symptoms of difficulty concentrating, headaches. He recalls EMS was called for evaluation. Was found to have pituitary tumor and underwent surgery. No new or chronic headaches, no cold intolerance or constipation.     Three months ago, developed dysarthria subsequently diagnosed with acute stroke.     Reports ED since stopping testosterone IM due to move.     Currently on the following replacement:  Testosterone  mg every two weeks.     PMHx:   Stroke  Sickle Cell Disease  Priapism following sickle cell attack  Sleep apnea   Borderline diabetes  Borderline hypertension    Current medications:  Desmopressin 0.1 mg tablet half in the morning and half at night  Hydrocortisone 5 mg three tablets in the morning and 2 tablets in the afternoon (2PM)  Lamictal 25 mg   Levothyroxine 25 mcg - once in the morning.   Paroxetine 40 mg   Seroquel 400 mg daily       Review of Systems   Constitutional: Positive for fatigue. Negative for chills and fever.   HENT: Negative for congestion and sinus pressure.    Eyes: Negative for visual disturbance.   Respiratory: Negative for chest tightness and shortness of breath.    Cardiovascular: Negative for chest pain and palpitations.   Gastrointestinal: Negative for abdominal distention, diarrhea, nausea and vomiting.   Genitourinary: Negative for dysuria and flank pain.   Musculoskeletal: Negative for back pain.   Skin: Negative for rash.   Neurological: Negative for dizziness and weakness.   Hematological: Does not bruise/bleed easily.   Psychiatric/Behavioral: Negative for sleep disturbance.     Objective:     Physical Exam   Constitutional: He is oriented to person, place, and time. He  appears well-developed and well-nourished. No distress.   HENT:   Head: Normocephalic and atraumatic.   Nose: Nose normal.   Mouth/Throat: Oropharynx is clear and moist. No oropharyngeal exudate.   Eyes: Conjunctivae and EOM are normal. Pupils are equal, round, and reactive to light. No scleral icterus.   Neck: Normal range of motion. Neck supple. No tracheal deviation present. No thyromegaly present.   Cardiovascular: Normal rate, regular rhythm, normal heart sounds and intact distal pulses.    Pulmonary/Chest: Effort normal and breath sounds normal.   Abdominal: Soft. Bowel sounds are normal. He exhibits no distension. There is no tenderness.   Musculoskeletal: Normal range of motion. He exhibits no edema or tenderness.   Lymphadenopathy:     He has no cervical adenopathy.   Neurological: He is alert and oriented to person, place, and time. He has normal reflexes.   Skin: Skin is warm and dry.   Psychiatric: He has a normal mood and affect.       Vitals:    06/20/17 1308   BP: 102/70   BP Location: Left arm   Patient Position: Sitting   BP Method: Manual   Pulse: 88   Weight: 98.9 kg (218 lb)   Height: 6' (1.829 m)     Results for KIMBERLEE ELIZABETH (MRN [id number]) as of 6/20/2017 13:22   Ref. Range 2/3/2017 14:06   TSH Latest Ref Range: 0.400 - 4.000 uIU/mL <0.010 (L)   Free T4 Latest Ref Range: 0.71 - 1.51 ng/dL 0.97       Brief review of medical records  (64 pages) from [facility] demonstrate CT of head with post -surgical changes from transsphenoidal resection.     Assessment/Plan:         1. Panhypopituitarism    - Testosterone; Future  - T4, free; Future  - Basic metabolic panel; Future  - Insulin-like growth factor; Future  - PSA, SCREENING; Future  - Prolactin; Future  - Cortisol, 8AM; Future  - Follicle stimulating hormone; Future  - Luteinizing hormone; Future    2. History of pituitary neoplasm    - Testosterone; Future  - T4, free; Future  - Basic metabolic panel; Future  - Insulin-like growth  factor; Future  - PSA, SCREENING; Future  - Prolactin; Future  - Cortisol, 8AM; Future  - Follicle stimulating hormone; Future  - Luteinizing hormone; Future    Refill T prescription   Awaiting medical records from [facility] in [place] (surgery)  Needs morning labs.   F/u in one year.

## 2017-06-21 ENCOUNTER — LAB VISIT (OUTPATIENT)
Dept: LAB | Facility: HOSPITAL | Age: 52
End: 2017-06-21
Attending: INTERNAL MEDICINE
Payer: MEDICARE

## 2017-06-21 ENCOUNTER — OFFICE VISIT (OUTPATIENT)
Dept: UROLOGY | Facility: CLINIC | Age: 52
End: 2017-06-21
Payer: MEDICARE

## 2017-06-21 VITALS
HEIGHT: 72 IN | SYSTOLIC BLOOD PRESSURE: 109 MMHG | DIASTOLIC BLOOD PRESSURE: 84 MMHG | HEART RATE: 97 BPM | BODY MASS INDEX: 29.83 KG/M2 | WEIGHT: 220.25 LBS

## 2017-06-21 DIAGNOSIS — N40.1 BPH WITH URINARY OBSTRUCTION: ICD-10-CM

## 2017-06-21 DIAGNOSIS — I10 ESSENTIAL HYPERTENSION: ICD-10-CM

## 2017-06-21 DIAGNOSIS — E23.0 PANHYPOPITUITARISM: Chronic | ICD-10-CM

## 2017-06-21 DIAGNOSIS — N13.8 BPH WITH URINARY OBSTRUCTION: ICD-10-CM

## 2017-06-21 DIAGNOSIS — Z87.898 HISTORY OF PITUITARY NEOPLASM: ICD-10-CM

## 2017-06-21 DIAGNOSIS — N52.01 ERECTILE DYSFUNCTION DUE TO ARTERIAL INSUFFICIENCY: Primary | ICD-10-CM

## 2017-06-21 PROBLEM — Z86.73 HISTORY OF STROKE: Status: RESOLVED | Noted: 2017-06-13 | Resolved: 2017-06-21

## 2017-06-21 PROBLEM — Z87.438 HISTORY OF PRIAPISM: Status: RESOLVED | Noted: 2017-06-13 | Resolved: 2017-06-21

## 2017-06-21 LAB
ANION GAP SERPL CALC-SCNC: 8 MMOL/L
BUN SERPL-MCNC: 17 MG/DL
CALCIUM SERPL-MCNC: 9.4 MG/DL
CHLORIDE SERPL-SCNC: 103 MMOL/L
CO2 SERPL-SCNC: 26 MMOL/L
COMPLEXED PSA SERPL-MCNC: 0.49 NG/ML
CORTIS SERPL-MCNC: 17.3 UG/DL
CREAT SERPL-MCNC: 1.4 MG/DL
EST. GFR  (AFRICAN AMERICAN): >60 ML/MIN/1.73 M^2
EST. GFR  (NON AFRICAN AMERICAN): 57.8 ML/MIN/1.73 M^2
FSH SERPL-ACNC: 0.2 MIU/ML
GLUCOSE SERPL-MCNC: 122 MG/DL
LH SERPL-ACNC: <0.1 MIU/ML
POTASSIUM SERPL-SCNC: 4.1 MMOL/L
PROLACTIN SERPL IA-MCNC: <0.6 NG/ML
SODIUM SERPL-SCNC: 137 MMOL/L
T4 FREE SERPL-MCNC: 0.82 NG/DL
TESTOST SERPL-MCNC: 1134 NG/DL

## 2017-06-21 PROCEDURE — 99999 PR PBB SHADOW E&M-EST. PATIENT-LVL III: CPT | Mod: PBBFAC,,, | Performed by: UROLOGY

## 2017-06-21 PROCEDURE — 99204 OFFICE O/P NEW MOD 45 MIN: CPT | Mod: S$GLB,,, | Performed by: UROLOGY

## 2017-06-21 RX ORDER — SILDENAFIL 100 MG/1
100 TABLET, FILM COATED ORAL DAILY PRN
Qty: 10 TABLET | Refills: 11 | Status: SHIPPED | OUTPATIENT
Start: 2017-06-21

## 2017-06-21 NOTE — PROGRESS NOTES
CHIEF COMPLAINT:    Mr. Marsh is a 52 y.o. male presenting with ED.    PRESENTING ILLNESS:    Magdy Marsh is a 52 y.o. male who c/o ED.    He has a history of a pituitary tumor s/p resection and is now being managed by endocrine for panhypopituitarism.  He's on IM TRT.   He also has a history of priapism due to sickle cell in 3/2017.  Since then, he's had the ED.  He's tried Cialis, but he could not tolerate it due to heart palpitations.    He has a decreased FOS.  He's pleased with how he voids.  No hematuria.  No dysuria.    REVIEW OF SYSTEMS:    Magdy Marsh denies any history of headache, blurred vision, fever, nausea, vomiting, chills, abdominal pain, bleeding per rectum, cough, SOB, recent loss of consciousness, recent mental status changes, seizures, dizziness, or upper or lower extremity weakness.    LATASHA  1. 5  2. 4  3. 4  4. 4  5. 3      PATIENT HISTORY:    Past Medical History:   Diagnosis Date    Adrenal insufficiency     BPH (benign prostatic hyperplasia)     CKD (chronic kidney disease), stage III     Depression     History of pituitary neoplasm 6/13/2017    History of priapism 6/13/2017    History of stroke 6/13/2017    Hypertension     Impaired fasting glucose     Low testosterone level in male     Thyroid disease        Past Surgical History:   Procedure Laterality Date    PITUITARY SURGERY         History reviewed. No pertinent family history.    Social History     Social History    Marital status: Single     Spouse name: N/A    Number of children: N/A    Years of education: N/A     Occupational History    Disabled      Social History Main Topics    Smoking status: Former Smoker     Packs/day: 0.15     Years: 15.00    Smokeless tobacco: Not on file    Alcohol use No    Drug use: Unknown    Sexual activity: Yes     Partners: Female     Other Topics Concern    Not on file     Social History Narrative    No narrative on file       Allergies:  Sulfa  "(sulfonamide antibiotics)    Medications:    Current Outpatient Prescriptions:     desmopressin (DDAVP) 0.1 MG Tab, Take 50 mcg by mouth 2 (two) times daily., Disp: , Rfl:     hydrocortisone (CORTEF) 5 MG Tab, Take 5 mg by mouth once daily., Disp: , Rfl:     lamotrigine (LAMICTAL) 25 MG tablet, Take 25 mg by mouth once daily., Disp: , Rfl:     levothyroxine (SYNTHROID) 25 MCG tablet, Take 25 mcg by mouth once daily., Disp: , Rfl:     needle, disp, 21 G 21 x 1 1/2 " Ndle, To draw up testosterone, Disp: 100 each, Rfl: 11    omeprazole (PRILOSEC) 40 MG capsule, Take 1 capsule (40 mg total) by mouth once daily., Disp: 30 capsule, Rfl: 2    paroxetine (PAXIL) 40 MG tablet, Take 1 tablet (40 mg total) by mouth every morning., Disp: 30 tablet, Rfl: 2    quetiapine (SEROQUEL) 400 MG tablet, Take 400 mg by mouth every evening., Disp: , Rfl:     sildenafil (VIAGRA) 25 MG tablet, Take 25 mg by mouth daily as needed for Erectile Dysfunction., Disp: , Rfl:     syringe, disposable, 3 mL Syrg, For twice monthly injections, Disp: 25 each, Rfl: 11    testosterone cypionate (DEPOTESTOTERONE CYPIONATE) 200 mg/mL injection, Inject 1 mL (200 mg total) into the muscle every 14 (fourteen) days., Disp: 10 mL, Rfl: 4    PHYSICAL EXAMINATION:    The patient generally appears in good health, is appropriately interactive, and is in no apparent distress.     Eyes: anicteric sclerae, moist conjunctivae; no lid-lag; PERRLA     HENT: Atraumatic; oropharynx clear with moist mucous membranes and no mucosal ulcerations;normal hard and soft palate.  No evidence of lymphadenopathy.    Neck: Trachea midline.  No thyromegaly.    Musculoskeletal: No abnormal gait.    Skin: No lesions.    Mental: Cooperative with normal affect.  Is oriented to time, place, and person.    Neuro: Grossly intact.    Chest: Normal inspiratory effort.   No accessory muscles.  No audible wheezes.  Respirations symmetric on inspiration and expiration.    Heart: Regular " rhythm.      Abdomen:  Soft, non-tender. No masses or organomegaly. Bladder is not palpable. No evidence of flank discomfort. No evidence of inguinal hernia.    Genitourinary: The penis is circumcised with no evidence of plaques or induration. The urethral meatus is normal. The testes, epididymides, and cord structures are normal in size and contour bilaterally. The scrotum is normal in size and contour.    Normal anal sphincter tone. No rectal mass.    The prostate is 30 g. Normal landmarks. Lateral sulci. Median furrow intact.  No nodularity or induration. Seminal vesicles are normal.    Extremities: No clubbing, cyanosis, or edema      LABS:      No results found for: PSA, PSADIAG, PSATOTAL, PSAFREE, PSAFREEPCT    IMPRESSION:    Encounter Diagnoses   Name Primary?    Erectile dysfunction due to arterial insufficiency Yes    BPH with urinary obstruction     Panhypopituitarism     Essential hypertension    HTN, controlled      PLAN:    1. Will draw a PSA today as he's due.  2. Will observe his LUTS as they don't bother him.  3. Discussed options for his ED.  He'd like generic Viagra from [facility]. Side effects discussed.  A new Rx was given  4. Continue TRT by endocrine.  5. RTC 3 months to re-evaluate    Copy to:

## 2017-06-21 NOTE — PATIENT INSTRUCTIONS
Oral Medications for Erectile Dysfunction  Prescription oral medications can be used for ED. They often work well. But, like all medications, they can have side effects. Also, they cant be used if a man has certain health problems or takes certain other medications. Talk with your doctor about oral ED medication. Ask whether it is right for you.  Types of Oral ED Medications  There are three types of prescription oral ED medications. Each one increases blood flow to the penis. When the penis is stimulated, an erection results. The three types are:  · Sildenafil citrate (Viagra)  · Tadalafil (Cialis)  · Vardenafil HCl (Levitra)  What Oral ED Medications Dont Do  There are some things ED medications cant do.  · They dont cure the cause of your ED. Without the medication, youll still have trouble getting an erection.  · They cant produce an erection without sexual stimulation.  · They wont increase sexual desire. They also wont solve any other sexual issues. Psychological, emotional, or relationship issues will not be fixed.  Taking Oral ED Medications Safely  · Do not combine ED medications with other treatments unless your doctor tells you to.  · Dont take ED medications more often or in larger doses than prescribed.  · Tell your doctor your health history. Mention all medications you take. This includes over-the-counter drugs, supplements, and herbs.  · Ask your doctor about whether you can drink alcohol while taking ED medication.  Possible Side Effects of Oral ED Medications  · Headache  · Facial flushing  · Runny or stuffy nose  · Indigestion  · Distortion of your color vision for a short time  · Sudden vision loss or hearing loss (rare)  Risks of Oral ED Medications   · Do not take ED medications if you take medications containing nitrates. The combination may drop your blood pressure to a dangerous level. Nitrates include nitroglycerin (a drug for angina). They are also in poppers, an inhaled  recreational drug. If youre not sure whether youre taking nitrates, ask your doctor or pharmacist.  · Medications called alpha-blockers can interact with ED medications. They can cause a sudden drop in blood pressure. Alpha-blockers are a common treatment for prostate problems. They also treat high blood pressure. Be sure your doctor knows if you take these medications.  · If youve had a heart attack or have heart disease and you have not had sex for a while, talk to your doctor. Having sex again can put extra strain on your heart. Your doctor can confirm that your heart is healthy enough for sex.  · It is rare, but some men taking ED medications have had sudden vision loss. This may be more likely if other health problems are present. These include high blood pressure and diabetes. Ask your doctor if you are at risk for this type of vision loss.  · In rare cases, an erection may last too long. This can badly damage the blood vessels in your penis. If an erection lasts longer than 4 hours, go to the emergency room right away.       © 5599-6638 [employer], [address] [place] [address], [place] [address]. All rights reserved. This information is not intended as a substitute for professional medical care. Always follow your healthcare professional's instructions.

## 2017-06-23 LAB
IGF-I SERPL-MCNC: 67 NG/ML (ref 37–245)
IGF-I Z-SCORE SERPL: -1.28 SD

## 2017-06-26 ENCOUNTER — TELEPHONE (OUTPATIENT)
Dept: ENDOCRINOLOGY | Facility: CLINIC | Age: 52
End: 2017-06-26

## 2017-06-26 DIAGNOSIS — E23.0 HYPOPITUITARISM: Primary | ICD-10-CM

## 2017-06-26 NOTE — TELEPHONE ENCOUNTER
Please call Mr. Marsh,     Let him know his testosterone levels were very very normal.   I just want to confirm whether or not he used his testosterone prescription prior to the injection.     If so I would like a repeat level before his next injection at his convenience.

## 2017-06-28 ENCOUNTER — TELEPHONE (OUTPATIENT)
Dept: ENDOCRINOLOGY | Facility: CLINIC | Age: 52
End: 2017-06-28

## 2017-07-03 ENCOUNTER — OFFICE VISIT (OUTPATIENT)
Dept: NEPHROLOGY | Facility: CLINIC | Age: 52
End: 2017-07-03
Payer: MEDICARE

## 2017-07-03 VITALS
DIASTOLIC BLOOD PRESSURE: 80 MMHG | SYSTOLIC BLOOD PRESSURE: 124 MMHG | WEIGHT: 219.56 LBS | HEIGHT: 72 IN | BODY MASS INDEX: 29.74 KG/M2 | HEART RATE: 80 BPM | OXYGEN SATURATION: 98 %

## 2017-07-03 DIAGNOSIS — N13.8 BPH WITH URINARY OBSTRUCTION: ICD-10-CM

## 2017-07-03 DIAGNOSIS — D57.3 SICKLE-CELL TRAIT: ICD-10-CM

## 2017-07-03 DIAGNOSIS — E23.0 PANHYPOPITUITARISM: Chronic | ICD-10-CM

## 2017-07-03 DIAGNOSIS — I10 ESSENTIAL HYPERTENSION: ICD-10-CM

## 2017-07-03 DIAGNOSIS — N40.1 BPH WITH URINARY OBSTRUCTION: ICD-10-CM

## 2017-07-03 DIAGNOSIS — N18.30 CKD (CHRONIC KIDNEY DISEASE) STAGE 3, GFR 30-59 ML/MIN: Primary | ICD-10-CM

## 2017-07-03 PROCEDURE — 99204 OFFICE O/P NEW MOD 45 MIN: CPT | Mod: S$GLB,,, | Performed by: INTERNAL MEDICINE

## 2017-07-03 PROCEDURE — 99999 PR PBB SHADOW E&M-EST. PATIENT-LVL IV: CPT | Mod: PBBFAC,,, | Performed by: INTERNAL MEDICINE

## 2017-07-03 PROCEDURE — 99214 OFFICE O/P EST MOD 30 MIN: CPT | Mod: PBBFAC | Performed by: INTERNAL MEDICINE

## 2017-07-03 NOTE — PATIENT INSTRUCTIONS
1. Labs:  Prior to next visit, and renal US    2.  Medications: no change    3. Referrals:    4. Follow up with PCP regarding: appetite change    5. BP:  Take BP and pulse  twice daily for one week, record              Bring results  to next visit.              Goal :   <140/90    6. Diet:  National Kidney Foundation:  www.kidney.org       Sodium: < 2000 milligrams daily including all food and drink      (one teaspoon of table salt has 2300 milligrams of sodium)         Phosphorus: <1000mg daily       Vaccines: please check with your PCP and be sure to have an annual flu vaccine and keep up to date with your pneumococcal vaccine for pneumonia      Please avoid or minimize all NSAIDS (ibuprofen, motrin, aleve, indocin, naprosyn, BC powder, mobic, relafen, alleve, and any others) to minimize the risk to your kidneys    Please avoid Proton pump inhibitors unless specifically necessary and speak with your PCP about alternatives such as H2 blockers     Return to clinic:  3 months

## 2017-07-03 NOTE — PROGRESS NOTES
Subjective:       Patient ID: Magdy Marsh is a 52 y.o. Black or  male who presents for new evaluation of renal function      HPI   53 yo AAm s/p pituitary resection for tumor, macroadenoma, surgery complicated by CSF leak, and pateitn required cardiac resusitation  And did have nonoliguric KUNAL,  now with pan hypopituitarism , including central DI, hypothyroidism, hypo cortisol, hypogonadism,  sickle cell trait, with a possible CVA  Affecting speech with dysarthria dn L sided weakeness, now on ASA but could not afford Xarelto. 2d echo demonstrated nl EF 60%, mild diastolic dysfunction. diet controlled DM,   HTN, CKD 3 est cert 1.4-1.5 , no proteinuria, followed by endocrine. Patient recently moved from [place] to [facility].  The patient has no symptoms of fatigue, shortness of breath, chest pain, peripheral edema, flank pain, dysuria, hematuria, foamy urine, orange colored urine, nephrolithiasis,  diarrhea, constipation, lower extremity leg cramping, headache, nausea and vomiting, or weakness.  On PPI 1/2014, for acid reflux, refractory to H2 blocker.  He recently  Changed his diet, and recent several months decreased appetite, but no weight loss      Review of Systems   Constitutional: Negative for activity change, appetite change, chills, diaphoresis, fatigue, fever and unexpected weight change.   HENT: Negative for congestion, ear discharge, ear pain, facial swelling, hearing loss, nosebleeds, sinus pressure, sore throat and trouble swallowing.    Eyes: Negative for photophobia, pain, discharge, redness, itching and visual disturbance.   Respiratory: Negative for apnea, cough, chest tightness, shortness of breath and wheezing.    Cardiovascular: Negative for chest pain, palpitations and leg swelling.   Gastrointestinal: Negative for abdominal distention, abdominal pain, constipation, diarrhea and vomiting.   Endocrine: Negative for cold intolerance, heat intolerance, polydipsia  and polyuria.   Genitourinary: Negative for decreased urine volume, difficulty urinating, dysuria, flank pain, frequency, hematuria, scrotal swelling, testicular pain and urgency.   Musculoskeletal: Negative for arthralgias, back pain, gait problem, joint swelling, myalgias, neck pain and neck stiffness.   Skin: Negative for color change, pallor, rash and wound.   Allergic/Immunologic: Negative for environmental allergies and food allergies.   Neurological: Negative for dizziness, tremors, seizures, syncope, facial asymmetry, speech difficulty, weakness, light-headedness, numbness and headaches.   Hematological: Negative for adenopathy. Does not bruise/bleed easily.   Psychiatric/Behavioral: Negative for agitation, behavioral problems, dysphoric mood and sleep disturbance. The patient is not nervous/anxious.        Objective:     Blood pressure 124/80, pulse 80, height 6' (1.829 m), weight 99.6 kg (219 lb 9.3 oz), SpO2 98 %.      Physical Exam   Constitutional: He is oriented to person, place, and time. He appears well-developed and well-nourished. No distress.   HENT:   Head: Normocephalic and atraumatic.   Mouth/Throat: Oropharynx is clear and moist. No oropharyngeal exudate.   Eyes: Conjunctivae and EOM are normal. Pupils are equal, round, and reactive to light. Right eye exhibits no discharge. Left eye exhibits no discharge. No scleral icterus.   Neck: Normal range of motion. Neck supple. No JVD present. No tracheal deviation present. No thyromegaly present.   Cardiovascular: Normal rate, regular rhythm and normal heart sounds.  Exam reveals no gallop and no friction rub.    No murmur heard.  Pulmonary/Chest: No stridor. No respiratory distress. He has no wheezes. He has no rales. He exhibits no tenderness.   Abdominal: Soft. Bowel sounds are normal. He exhibits no distension and no mass. There is no tenderness. There is no rebound and no guarding. No hernia.   Musculoskeletal: Normal range of motion. He  exhibits no edema or tenderness.   Lymphadenopathy:     He has no cervical adenopathy.   Neurological: He is alert and oriented to person, place, and time. No cranial nerve deficit. He exhibits normal muscle tone. Coordination normal.   Skin: Skin is warm and dry. No rash noted. He is not diaphoretic. No erythema. No pallor.   Psychiatric: He has a normal mood and affect. His behavior is normal. Judgment and thought content normal.   Nursing note and vitals reviewed.      Assessment:       1. CKD (chronic kidney disease) stage 3, GFR 30-59 ml/min    2. Sickle-cell trait    3. Panhypopituitarism    4. Essential hypertension    5. BPH with urinary obstruction        Plan:       53 yo AAM with pan hypopitutiary after resection of macroadenoma 2013, complicated by KUNAL sickle cell trait, HTN  CKD stage 3, who had been followed by [place] nephrologist prior to moving.    CKD stage 3 est gfr 50-55 cc/min  Check serologies and renal US as these are not available    Central DI:  On desmopressin, currently euvolemic and electrolyes stable, follow  rfp  q 3 months     HTN   : diet controlled will check     CVA, unclear if patient needs Xarelto, suggest neurology /cardiac eval as indicated ( echo report in media)    Acid reflux:  Suggest gi eval and d/c PPI and use H2 blocker if no contraindication.    Metabolic bone diseas, check PTH, vit d  Level and phos    Anemia: none

## 2017-07-13 ENCOUNTER — HOSPITAL ENCOUNTER (EMERGENCY)
Facility: HOSPITAL | Age: 52
Discharge: HOME | End: 2017-07-13
Attending: EMERGENCY MEDICINE
Payer: MEDICARE

## 2017-07-13 VITALS
OXYGEN SATURATION: 96 % | HEART RATE: 81 BPM | WEIGHT: 218 LBS | SYSTOLIC BLOOD PRESSURE: 144 MMHG | TEMPERATURE: 99 F | BODY MASS INDEX: 29.53 KG/M2 | RESPIRATION RATE: 19 BRPM | DIASTOLIC BLOOD PRESSURE: 72 MMHG | HEIGHT: 72 IN

## 2017-07-13 DIAGNOSIS — D57.3 SICKLE-CELL TRAIT: ICD-10-CM

## 2017-07-13 DIAGNOSIS — N48.30 PRIAPISM: Primary | ICD-10-CM

## 2017-07-13 LAB
ALBUMIN SERPL BCP-MCNC: 3.1 G/DL
ALLENS TEST: ABNORMAL
ALLENS TEST: ABNORMAL
ALP SERPL-CCNC: 73 U/L
ALT SERPL W/O P-5'-P-CCNC: 19 U/L
ANION GAP SERPL CALC-SCNC: 6 MMOL/L
ANISOCYTOSIS BLD QL SMEAR: SLIGHT
AST SERPL-CCNC: 38 U/L
BASOPHILS # BLD AUTO: 0.11 K/UL
BASOPHILS NFR BLD: 1.8 %
BILIRUB SERPL-MCNC: 0.3 MG/DL
BUN SERPL-MCNC: 10 MG/DL
CALCIUM SERPL-MCNC: 8.8 MG/DL
CHLORIDE SERPL-SCNC: 109 MMOL/L
CO2 SERPL-SCNC: 24 MMOL/L
CREAT SERPL-MCNC: 1.3 MG/DL
DELSYS: ABNORMAL
DELSYS: ABNORMAL
DIFFERENTIAL METHOD: ABNORMAL
EOSINOPHIL # BLD AUTO: 0.3 K/UL
EOSINOPHIL NFR BLD: 4.9 %
ERYTHROCYTE [DISTWIDTH] IN BLOOD BY AUTOMATED COUNT: 17.2 %
EST. GFR  (AFRICAN AMERICAN): >60 ML/MIN/1.73 M^2
EST. GFR  (NON AFRICAN AMERICAN): >60 ML/MIN/1.73 M^2
GLUCOSE SERPL-MCNC: 155 MG/DL
HCO3 UR-SCNC: ABNORMAL MMOL/L
HCO3 UR-SCNC: ABNORMAL MMOL/L
HCT VFR BLD AUTO: 34.8 %
HGB BLD-MCNC: 12.3 G/DL
INR PPP: 1.2
LYMPHOCYTES # BLD AUTO: 3.3 K/UL
LYMPHOCYTES NFR BLD: 53.1 %
MCH RBC QN AUTO: 23.7 PG
MCHC RBC AUTO-ENTMCNC: 35.3 %
MCV RBC AUTO: 67 FL
MODE: ABNORMAL
MODE: ABNORMAL
MONOCYTES # BLD AUTO: 0.8 K/UL
MONOCYTES NFR BLD: 13.4 %
NEUTROPHILS # BLD AUTO: 1.7 K/UL
NEUTROPHILS NFR BLD: 26.8 %
PCO2 BLDA: >130 MMHG (ref 35–45)
PCO2 BLDA: >130 MMHG (ref 35–45)
PH SMN: <6.5 [PH] (ref 7.35–7.45)
PH SMN: <6.5 [PH] (ref 7.35–7.45)
PLATELET # BLD AUTO: 230 K/UL
PMV BLD AUTO: 9.8 FL
PO2 BLDA: 6 MMHG (ref 80–100)
PO2 BLDA: <5 MMHG (ref 80–100)
POC BE: ABNORMAL MMOL/L
POC BE: ABNORMAL MMOL/L
POC SATURATED O2: ABNORMAL %
POC SATURATED O2: ABNORMAL %
POC TCO2: ABNORMAL MMOL/L
POC TCO2: ABNORMAL MMOL/L
POIKILOCYTOSIS BLD QL SMEAR: SLIGHT
POTASSIUM SERPL-SCNC: 3.6 MMOL/L
PROT SERPL-MCNC: 6.5 G/DL
PROTHROMBIN TIME: 12.7 SEC
RBC # BLD AUTO: 5.19 M/UL
SAMPLE: ABNORMAL
SAMPLE: ABNORMAL
SITE: ABNORMAL
SITE: ABNORMAL
SODIUM SERPL-SCNC: 139 MMOL/L
TARGETS BLD QL SMEAR: ABNORMAL
WBC # BLD AUTO: 6.27 K/UL

## 2017-07-13 PROCEDURE — 85610 PROTHROMBIN TIME: CPT

## 2017-07-13 PROCEDURE — 25000003 PHARM REV CODE 250: Performed by: UROLOGY

## 2017-07-13 PROCEDURE — 63600175 PHARM REV CODE 636 W HCPCS: Performed by: EMERGENCY MEDICINE

## 2017-07-13 PROCEDURE — 80053 COMPREHEN METABOLIC PANEL: CPT

## 2017-07-13 PROCEDURE — 96376 TX/PRO/DX INJ SAME DRUG ADON: CPT

## 2017-07-13 PROCEDURE — 25000003 PHARM REV CODE 250: Performed by: EMERGENCY MEDICINE

## 2017-07-13 PROCEDURE — 96374 THER/PROPH/DIAG INJ IV PUSH: CPT

## 2017-07-13 PROCEDURE — 99285 EMERGENCY DEPT VISIT HI MDM: CPT | Mod: ,,, | Performed by: EMERGENCY MEDICINE

## 2017-07-13 PROCEDURE — 99284 EMERGENCY DEPT VISIT MOD MDM: CPT | Mod: 25

## 2017-07-13 PROCEDURE — 96375 TX/PRO/DX INJ NEW DRUG ADDON: CPT

## 2017-07-13 PROCEDURE — 63600175 PHARM REV CODE 636 W HCPCS: Performed by: UROLOGY

## 2017-07-13 PROCEDURE — 85025 COMPLETE CBC W/AUTO DIFF WBC: CPT

## 2017-07-13 PROCEDURE — 63600175 PHARM REV CODE 636 W HCPCS: Performed by: STUDENT IN AN ORGANIZED HEALTH CARE EDUCATION/TRAINING PROGRAM

## 2017-07-13 RX ORDER — ONDANSETRON 2 MG/ML
4 INJECTION INTRAMUSCULAR; INTRAVENOUS
Status: COMPLETED | OUTPATIENT
Start: 2017-07-13 | End: 2017-07-13

## 2017-07-13 RX ORDER — MIDAZOLAM HYDROCHLORIDE 1 MG/ML
2 INJECTION INTRAMUSCULAR; INTRAVENOUS ONCE
Status: COMPLETED | OUTPATIENT
Start: 2017-07-13 | End: 2017-07-13

## 2017-07-13 RX ORDER — LIDOCAINE HYDROCHLORIDE 10 MG/ML
30 INJECTION INFILTRATION; PERINEURAL ONCE
Status: COMPLETED | OUTPATIENT
Start: 2017-07-13 | End: 2017-07-13

## 2017-07-13 RX ORDER — HYDROCODONE BITARTRATE AND ACETAMINOPHEN 5; 325 MG/1; MG/1
1 TABLET ORAL EVERY 4 HOURS PRN
Qty: 7 TABLET | Refills: 0 | Status: SHIPPED | OUTPATIENT
Start: 2017-07-13 | End: 2017-07-24

## 2017-07-13 RX ORDER — HYDROMORPHONE HYDROCHLORIDE 1 MG/ML
2 INJECTION, SOLUTION INTRAMUSCULAR; INTRAVENOUS; SUBCUTANEOUS ONCE
Status: COMPLETED | OUTPATIENT
Start: 2017-07-13 | End: 2017-07-13

## 2017-07-13 RX ORDER — SODIUM CHLORIDE 9 MG/ML
1000 INJECTION, SOLUTION INTRAVENOUS
Status: DISCONTINUED | OUTPATIENT
Start: 2017-07-13 | End: 2017-07-13

## 2017-07-13 RX ORDER — PHENYLEPHRINE HYDROCHLORIDE 10 MG/ML
100 INJECTION INTRAVENOUS ONCE
Status: COMPLETED | OUTPATIENT
Start: 2017-07-13 | End: 2017-07-13

## 2017-07-13 RX ORDER — HYDROMORPHONE HYDROCHLORIDE 1 MG/ML
2 INJECTION, SOLUTION INTRAMUSCULAR; INTRAVENOUS; SUBCUTANEOUS ONCE
Status: DISCONTINUED | OUTPATIENT
Start: 2017-07-13 | End: 2017-07-13 | Stop reason: HOSPADM

## 2017-07-13 RX ORDER — HYDROMORPHONE HYDROCHLORIDE 1 MG/ML
1 INJECTION, SOLUTION INTRAMUSCULAR; INTRAVENOUS; SUBCUTANEOUS
Status: COMPLETED | OUTPATIENT
Start: 2017-07-13 | End: 2017-07-13

## 2017-07-13 RX ADMIN — SODIUM CHLORIDE, SODIUM LACTATE, POTASSIUM CHLORIDE, AND CALCIUM CHLORIDE 1000 ML: .6; .31; .03; .02 INJECTION, SOLUTION INTRAVENOUS at 10:07

## 2017-07-13 RX ADMIN — HYDROMORPHONE HYDROCHLORIDE 1 MG: 1 INJECTION, SOLUTION INTRAMUSCULAR; INTRAVENOUS; SUBCUTANEOUS at 08:07

## 2017-07-13 RX ADMIN — LIDOCAINE HYDROCHLORIDE 30 ML: 10 INJECTION, SOLUTION INFILTRATION; PERINEURAL at 10:07

## 2017-07-13 RX ADMIN — MIDAZOLAM HYDROCHLORIDE 2 MG: 1 INJECTION, SOLUTION INTRAMUSCULAR; INTRAVENOUS at 10:07

## 2017-07-13 RX ADMIN — ONDANSETRON 4 MG: 2 INJECTION INTRAMUSCULAR; INTRAVENOUS at 08:07

## 2017-07-13 RX ADMIN — HYDROMORPHONE HYDROCHLORIDE 2 MG: 1 INJECTION, SOLUTION INTRAMUSCULAR; INTRAVENOUS; SUBCUTANEOUS at 09:07

## 2017-07-13 RX ADMIN — PHENYLEPHRINE HYDROCHLORIDE 100 MCG: 10 INJECTION INTRAVENOUS at 11:07

## 2017-07-13 RX ADMIN — SODIUM CHLORIDE, SODIUM LACTATE, POTASSIUM CHLORIDE, AND CALCIUM CHLORIDE 1000 ML: .6; .31; .03; .02 INJECTION, SOLUTION INTRAVENOUS at 07:07

## 2017-07-13 NOTE — CONSULTS
Urology ([facility]) Consult  Staff: Dr. Lj MD     CC: Priapism     HPI:  Magdy Marsh is a 52 y.o. male with hx of sickle cell disease who has had sickle induced CVA and prior priapism who required intra op shunt for resolution who presents with 5 hour priapism.  He denies taking any medications such as viagra or cialis and denies illicits.  He has seen Dr. Shannon for ED.  He is on IM testosterone for pan hypopituitarism.       He is on Xartelo and ASA 81 mg for sickle cell and stroke hx.       ROS:  Neg except per HPI          Past Medical History:   Diagnosis Date    Adrenal insufficiency      BPH (benign prostatic hyperplasia)      CKD (chronic kidney disease), stage III      Depression      History of pituitary neoplasm 6/13/2017    History of priapism 6/13/2017    History of stroke 6/13/2017    Hypertension      Impaired fasting glucose      Low testosterone level in male      Thyroid disease                 Past Surgical History:   Procedure Laterality Date    PITUITARY SURGERY             Social History            Social History    Marital status: Single       Spouse name: N/A    Number of children: N/A    Years of education: N/A           Occupational History    Disabled              Social History Main Topics    Smoking status: Former Smoker       Packs/day: 0.15       Years: 15.00    Smokeless tobacco: Never Used    Alcohol use No    Drug use: Unknown    Sexual activity: Yes       Partners: Female           Other Topics Concern    None          Social History Narrative    None         History reviewed. No pertinent family history.          Review of patient's allergies indicates:   Allergen Reactions    Sulfa (sulfonamide antibiotics) Hives         No current facility-administered medications on file prior to encounter.              Current Outpatient Prescriptions on File Prior to Encounter   Medication Sig Dispense Refill    desmopressin (DDAVP) 0.1 MG Tab Take  "50 mcg by mouth 2 (two) times daily.        hydrocortisone (CORTEF) 5 MG Tab Take 5 mg by mouth once daily.        lamotrigine (LAMICTAL) 25 MG tablet Take 25 mg by mouth once daily.        levothyroxine (SYNTHROID) 25 MCG tablet Take 25 mcg by mouth once daily.        omeprazole (PRILOSEC) 40 MG capsule Take 1 capsule (40 mg total) by mouth once daily. 30 capsule 2    paroxetine (PAXIL) 40 MG tablet Take 1 tablet (40 mg total) by mouth every morning. 30 tablet 2    quetiapine (SEROQUEL) 400 MG tablet Take 400 mg by mouth every evening.        testosterone cypionate (DEPOTESTOTERONE CYPIONATE) 200 mg/mL injection Inject 1 mL (200 mg total) into the muscle every 14 (fourteen) days. 10 mL 4    needle, disp, 21 G 21 x 1 1/2 " Ndle To draw up testosterone 100 each 11    sildenafil (VIAGRA) 100 MG tablet Take 1 tablet (100 mg total) by mouth daily as needed for Erectile Dysfunction. Take 1 hour before intercourse. 10 tablet 11    syringe, disposable, 3 mL Syrg For twice monthly injections 25 each 11         Anticoagulation:  Yes Xarelto and ASA 81 mg     Physical Exam:     AAOx4, mildly distressed due to pain, WDWN  NC/AT, EOMI, PER, sclerae anicteric, speech normal, tongue midline  Nl effort, CTAB  RRR  Soft, non-tender, non-distended  Penis is firm, erect, tender to palpation  Testes descended with no abnormalities     Labs:              Lab Results   Component Value Date     WBC 6.27 07/13/2017     HGB 12.3 (L) 07/13/2017     HCT 34.8 (L) 07/13/2017     MCV 67 (L) 07/13/2017      07/13/2017         BMP        Lab Results   Component Value Date      07/13/2017     K 3.6 07/13/2017      07/13/2017     CO2 24 07/13/2017     BUN 10 07/13/2017     CREATININE 1.3 07/13/2017     CALCIUM 8.8 07/13/2017     ANIONGAP 6 (L) 07/13/2017     ESTGFRAFRICA >60.0 07/13/2017     EGFRNONAA >60.0 07/13/2017               Lab Results   Component Value Date     PSA 0.49 06/21/2017         Assessment: Timonthy " Chance Marsh is a 52 y.o. male with hx of sickle cell disease and priapism with new onset priapism of 5 hours duration     Procedure:  Informed consent was obtained from his wife.  Verbal informed consent was obtained from the patient. Continuous blood pressure and cardiac monitoring were initiated.  He was prepared and draped.  Local anesthetic was infiltrated in a ring block.  Blood was aspirated and sent for blood gas.  This was consistent with ischemic priapism. We then performed irrigation, aspiration, and injection of phenylephrine under continuous monitoring. We were able to get him to detumesce.     Plan:      1. Instructed patient to remove wrap in 24 hours  2. Advised patient not to purposefully achieve erection for at least 1 week  3. Return to ED should erection return for >4 hours  4. Patient will need Hematology/Oncology follow up to further evaluate his sickle cell disease     Reginald Paz MD

## 2017-07-13 NOTE — ED PROVIDER NOTES
"Encounter Date: 7/13/2017    SCRIBE #1 NOTE: I, Danika Kilpatrick, am scribing for, and in the presence of,  Dr. Mcnulty. I have scribed the following portions of the note - the Resident attestation.       History     Chief Complaint   Patient presents with    Male  Problem     Pt c/o "really bad hard on".  Onset 5hrs.  Pt states "It is a sickle cell problem"       Patient is a 52 year old male with sickle cell trait, CKD III who presents with painful erection. Patient states that he was woken from sleep estimated 4-5 hours ago with a painful erection. Denies all other complaints at this time besides feeling dehydrated. Patient states that he has been seen in the ED before for the same complaint. Denies SOB, chest pain, abdominal pain. Patient is prescribed viagra, but did not take this medication in the past 24hrs.           Review of patient's allergies indicates:   Allergen Reactions    Sulfa (sulfonamide antibiotics) Hives     Past Medical History:   Diagnosis Date    Adrenal insufficiency     BPH (benign prostatic hyperplasia)     CKD (chronic kidney disease), stage III     Depression     History of pituitary neoplasm 6/13/2017    History of priapism 6/13/2017    History of stroke 6/13/2017    Hypertension     Impaired fasting glucose     Low testosterone level in male     Thyroid disease      Past Surgical History:   Procedure Laterality Date    PITUITARY SURGERY       History reviewed. No pertinent family history.  Social History   Substance Use Topics    Smoking status: Former Smoker     Packs/day: 0.15     Years: 15.00    Smokeless tobacco: Never Used    Alcohol use No     Review of Systems   Constitutional: Negative for chills and fever.   HENT: Negative for trouble swallowing.    Eyes: Negative for visual disturbance.   Respiratory: Negative for shortness of breath.    Cardiovascular: Negative for chest pain.   Gastrointestinal: Negative for abdominal pain, constipation, diarrhea, nausea and " vomiting.   Genitourinary: Positive for difficulty urinating, penile pain and penile swelling. Negative for scrotal swelling and testicular pain.   Neurological: Negative for weakness and numbness.       Physical Exam     Initial Vitals [07/13/17 0706]   BP Pulse Resp Temp SpO2   (!) 140/92 101 20 98.5 °F (36.9 °C) 98 %      MAP       108         Physical Exam    Vitals reviewed.  Constitutional: He appears well-developed and well-nourished. He is not diaphoretic. He appears distressed.   Patient appears in pain   HENT:   Head: Normocephalic and atraumatic.   Mouth/Throat: Oropharynx is clear and moist.   Eyes: Pupils are equal, round, and reactive to light. No scleral icterus.   Cardiovascular: Regular rhythm, normal heart sounds and intact distal pulses.   No murmur heard.  Tachycardic   Pulmonary/Chest: Breath sounds normal. No respiratory distress. He has no wheezes. He has no rhonchi. He has no rales.   Abdominal: Soft. Bowel sounds are normal. He exhibits no distension. There is no tenderness.   Genitourinary:   Genitourinary Comments: Penis is erect, tender   Musculoskeletal: He exhibits no edema.   Neurological: He is alert. He has normal strength.   Skin: Skin is warm and dry.         ED Course   Procedures  Labs Reviewed   CBC W/ AUTO DIFFERENTIAL - Abnormal; Notable for the following:        Result Value    Hemoglobin 12.3 (*)     Hematocrit 34.8 (*)     MCV 67 (*)     MCH 23.7 (*)     RDW 17.2 (*)     Gran # 1.7 (*)     Gran% 26.8 (*)     Lymph% 53.1 (*)     All other components within normal limits   COMPREHENSIVE METABOLIC PANEL - Abnormal; Notable for the following:     Glucose 155 (*)     Albumin 3.1 (*)     Anion Gap 6 (*)     All other components within normal limits   PROTIME-INR - Abnormal; Notable for the following:     Prothrombin Time 12.7 (*)     All other components within normal limits   ISTAT PROCEDURE - Abnormal; Notable for the following:     POC PH <6.500 (*)     POC PCO2 >130.0 (*)      POC PO2 <5 (*)     All other components within normal limits   ISTAT PROCEDURE - Abnormal; Notable for the following:     POC PH <6.500 (*)     POC PCO2 >130.0 (*)     POC PO2 6 (*)     All other components within normal limits             Medical Decision Making:   History:   Old Medical Records: I decided to obtain old medical records.  Initial Assessment:   52 year old male with sickle cell trait and CKDIII presents with painful erection lasting 4-5 hours. Patient is prescribed viagra but has not taken within 24hrs. He has been to the ED before once with priapism. Patient's vitals are stable. Urology consulted immediately, patient given fluids for hydration. Dilaudid for pain control. Urology was able to reduce the priapism, and patient is stable for discharge.     Differential Diagnosis:   1) priapism  2) pain crises  3) medication side effect.   Clinical Tests:   Lab Tests: Ordered and Reviewed  Other:   I have discussed this case with another health care provider.       APC / Resident Notes:   PGY-4 MDM Update:  I assumed care from Dr. Ann who was previously seeing pt with Dr. Mcnulty. Pt was pending re-assessment after being given versed for mild sedation during dehumescence with urology. Pt is now alert and oriented X 3, able to ambulate without assistance. Able to urinate without any problems and is ready for discharge. Pt given instructions to call urology today to schedule appointment for 1 week and to f/u with PCP as scheduled on Monday. Pt also given strict return precautions for any worsening symptoms or reoccurence of priapism lasting > 1 hour. Pt given perscription for norco X 2 days but pt left without getting script, attempted to call patient, went straight to voicemail.    Laxmi Torres MD PGY-4  12:52 PM    PGY-4 MDM Update:  Patient return to ER and was able to get his prescription.  Here.  Patient also requested a work excuse note.    Laxmi Torres MD PGY-4  12:58 PM         Scribe  Attestation:   Scribe #1: I performed the above scribed service and the documentation accurately describes the services I performed. I attest to the accuracy of the note.    Attending Attestation:   Physician Attestation Statement for Resident:  As the supervising MD   Physician Attestation Statement: I have personally seen and examined this patient.   I agree with the above history. -: 52 y.o. male with hx of sickle cell trait presents for evaluation of persistent painful erection for several hours duration.    As the supervising MD I agree with the above PE.    As the supervising MD I agree with the above treatment, course, plan, and disposition.  I was personally present during the critical portions of the procedure(s) performed by the resident and was immediately available in the ED to provide services and assistance as needed during the entire procedure.  I have reviewed the following: old records at this facility.          Physician Attestation for Scribe:  Physician Attestation Statement for Scribe #1: I, Dr. Mcnulty, reviewed documentation, as scribed by aDnika Kilpatrick in my presence, and it is both accurate and complete.                 ED Course     Clinical Impression:   Priapism    Disposition:   Disposition: Discharged  Condition: Stable                        Laxmi Torres MD  Resident  07/13/17 1300       Laxmi Torres MD  Resident  07/13/17 5978       Nabor Mcnulty MD  07/18/17 3394

## 2017-07-13 NOTE — ED TRIAGE NOTES
Pt has sickle cell and reports developing an erection about 5 hours ago that will not go away. Pt reports pain to penis. Pt states this happened before in February which required surgery to remedy the situation.

## 2017-07-13 NOTE — ED NOTES
Patient identifiers verified and correct for Magdy Marsh.   LOC: The patient is awake, alert and aware of environment with an appropriate affect, the patient is oriented x 3 and speaking appropriately.   APPEARANCE: Patient appears uncomfortable but in no acute distress, patient is clean and well groomed.  SKIN: The skin is warm and dry, color consistent with ethnicity, patient has normal skin turgor and moist mucus membranes, skin intact, no breakdown or bruising noted.   MUSCULOSKELETAL: Patient moving all extremities spontaneously, no swelling noted.  RESPIRATORY: Airway is open and patent, respirations are spontaneous, patient has a normal effort and rate, no accessory muscle use noted, pt placed on continuous pulse ox with O2 sats noted at 97% on room air.  CARDIAC: Pt placed on cardiac monitor. Patient has a normal rate and regular rhythm, no edema noted, capillary refill < 3 seconds.   GASTRO: Soft and non tender to palpation, no distention noted, normoactive bowel sounds present in all four quadrants. Pt states bowel movements have been regular.  : Pt denies any pain or frequency with urination. Pt has an erection that has lasted longer than 5 hours.  NEURO: Pt opens eyes spontaneously, behavior appropriate to situation, follows commands, facial expression symmetrical, bilateral hand grasp equal and even, purposeful motor response noted, normal sensation in all extremities when touched with a finger.

## 2017-07-17 ENCOUNTER — OFFICE VISIT (OUTPATIENT)
Dept: SLEEP MEDICINE | Facility: CLINIC | Age: 52
End: 2017-07-17
Payer: MEDICARE

## 2017-07-17 VITALS
BODY MASS INDEX: 30.22 KG/M2 | DIASTOLIC BLOOD PRESSURE: 79 MMHG | HEIGHT: 72 IN | HEART RATE: 80 BPM | SYSTOLIC BLOOD PRESSURE: 122 MMHG | WEIGHT: 223.13 LBS

## 2017-07-17 DIAGNOSIS — G47.33 OSA (OBSTRUCTIVE SLEEP APNEA): Primary | ICD-10-CM

## 2017-07-17 PROCEDURE — 99999 PR PBB SHADOW E&M-EST. PATIENT-LVL III: CPT | Mod: PBBFAC,,, | Performed by: PSYCHIATRY & NEUROLOGY

## 2017-07-17 PROCEDURE — 99204 OFFICE O/P NEW MOD 45 MIN: CPT | Mod: S$GLB,,, | Performed by: PSYCHIATRY & NEUROLOGY

## 2017-07-17 NOTE — PROGRESS NOTES
Magdy Marsh  was seen at the request of  Self, Aaareferral for sleep evaluation.    07/17/2017 INITIAL HISTORY OF PRESENT ILLNESS:  Magdy Marsh is a 52 y.o. male is here to be evaluated for a sleep disorder.       CHIEF COMPLAINT:      The patient's complaints include excessive daytime sleepiness, excessive daytime fatigue, snoring,  witnessed breathing pauses,  gasping for air in sleep and interrupted sleep since  Many years ago.    Had CVA in 2/17.    Was diagnosed with moderate MAME prior to that - never used CPAP. Had more recent study in 5/17.    Denies  dry mouth and sore throat  Denies nasal congestion   Denies  morning headaches  reports interrupted sleep  Reports frequent leg movements  Denies symptoms concerning for parasomnia    The ESS (Cromwell Sleepiness Score) taken on initial visit is 2 /24    The patient never had tonsillectomy, adenoidectomy or UPPP      SLEEP ROUTINE AND LIFESTYLE 07/17/2017 :    Occupation:retired    Bed partner: +     Time to bed - wake up time on a workday : 11 PM to 6 AM  Time to bed - wake up time on a day off: 11  to  6  Sleep onset latency: 1-2 hr  Disruptions or awakenings: 2  Time to fall back into sleep: 30 min or less   Perceived sleep quality: 2/5  Perceived total sleep time:  3-4  hours.  Daytime naps: 0    Exercise routine: yes  Caffeine:      PREVIOUS SLEEP STUDIES:     PSG/ SPLIT night study  In 2017 showed significant MAME with the AHI of 5/17/hour and SaO2 minimum of 80 %.     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/2017    CKD (chronic kidney disease) stage 3, GFR 30-59 ml/min 02/07/2017    MAME (obstructive sleep apnea) 06/13/2017    Essential hypertension 06/13/2017    Sickle-cell trait 06/13/2017    Erectile dysfunction due to arterial insufficiency 06/21/2017    BPH with urinary obstruction 06/21/2017     Resolved Ambulatory Problems  "    Diagnosis Date Noted    History of stroke 06/13/2017    History of pituitary neoplasm 06/13/2017    History of priapism 06/13/2017     Past Medical History:   Diagnosis Date    Adrenal insufficiency     BPH (benign prostatic hyperplasia)     CKD (chronic kidney disease), stage III     Depression     History of pituitary neoplasm 6/13/2017    History of priapism 6/13/2017    History of stroke 6/13/2017    Hypertension     Impaired fasting glucose     Low testosterone level in male     Thyroid disease                 PAST SURGICAL HISTORY:    Past Surgical History:   Procedure Laterality Date    PITUITARY SURGERY           FAMILY HISTORY:                No family history on file.    SOCIAL HISTORY:          Tobacco:   History   Smoking Status    Former Smoker    Packs/day: 0.15    Years: 15.00   Smokeless Tobacco    Never Used       alcohol use:    History   Alcohol Use No                   ALLERGIES:    Review of patient's allergies indicates:   Allergen Reactions    Sulfa (sulfonamide antibiotics) Hives       CURRENT MEDICATIONS:    Current Outpatient Prescriptions   Medication Sig Dispense Refill    desmopressin (DDAVP) 0.1 MG Tab Take 50 mcg by mouth 2 (two) times daily.      hydrocodone-acetaminophen 5-325mg (NORCO) 5-325 mg per tablet Take 1 tablet by mouth every 4 (four) hours as needed for Pain. 7 tablet 0    hydrocortisone (CORTEF) 5 MG Tab Take 5 mg by mouth once daily.      lamotrigine (LAMICTAL) 25 MG tablet Take 25 mg by mouth once daily.      levothyroxine (SYNTHROID) 25 MCG tablet Take 25 mcg by mouth once daily.      needle, disp, 21 G 21 x 1 1/2 " Ndle To draw up testosterone 100 each 11    omeprazole (PRILOSEC) 40 MG capsule Take 1 capsule (40 mg total) by mouth once daily. 30 capsule 2    paroxetine (PAXIL) 40 MG tablet Take 1 tablet (40 mg total) by mouth every morning. 30 tablet 2    quetiapine (SEROQUEL) 400 MG tablet Take 400 mg by mouth every evening.      " sildenafil (VIAGRA) 100 MG tablet Take 1 tablet (100 mg total) by mouth daily as needed for Erectile Dysfunction. Take 1 hour before intercourse. 10 tablet 11    syringe, disposable, 3 mL Syrg For twice monthly injections 25 each 11    testosterone cypionate (DEPOTESTOTERONE CYPIONATE) 200 mg/mL injection Inject 1 mL (200 mg total) into the muscle every 14 (fourteen) days. 10 mL 4     No current facility-administered medications for this visit.                       REVIEW OF SYSTEMS:   Sleep related symptoms as per HPI    denies weight gain  Denies dyspnea  Denies palpitations  Denies acid reflux   Denies polyuria  Denies  mood diturbance  Denies  anemia  Denies  muscle pain  Denies  Gait imbalance    Otherwise, a balance of 10 systems reviewed is negative.    PHYSICAL EXAM:  /79 (BP Location: Left arm, Patient Position: Sitting, BP Method: Automatic)   Pulse 80   Ht 6' (1.829 m)   Wt 101.2 kg (223 lb 1.7 oz)   BMI 30.26 kg/m²   GENERAL: Normal development, well groomed.  HEENT:   HEENT:  Conjunctivae are non-erythematous; Pupils equal, round, and reactive to light; Nose is symmetrical; Nasal mucosa is pink and moist; Septum is midline; Inferior turbinates are hypertrophied; Nasal airflow is normal; Posterior pharynx is pink; Modified Mallampati:IV; Posterior palate is low; Tonsils not visualized; Uvula is wide and elongated;Tongue is enlarged; Dentition is fair; No TMJ tenderness; Jaw opening and protrusion without click and without discomfort.  NECK: Supple. Neck circumference is 15 inches. No thyromegaly. No palpable nodes.     SKIN: On face and neck: No abrasions, no rashes, no lesions.  No subcutaneous nodules are palpable.  RESPIRATORY: Chest is clear to auscultation.  Normal chest expansion and non-labored breathing at rest.  CARDIOVASCULAR: Normal S1, S2.  No murmurs, gallops or rubs. No carotid bruits bilaterally.  No edema. No clubbing. No cyanosis.    NEURO: Oriented to time, place and  "person. Normal attention span and concentration. Gait normal.    PSYCH: Affect is full. Mood is normal  MUSCULOSKELETAL: Moves 4 extremities. Gait normal.         Using [facility]: not yet      ASSESSMENT:    1. MAME The patient symptomatically has  excessive daytime sleepiness, snoring,  witnessed breathing pauses, excessive daytime fatigue, gasping for air in sleep and interrupted sleep  with exam findings of "a crowded oral airway and elevated body mass index. The patient has medical co-morbidities of hypertension and history of stroke,  which can be worsened by MAME. This warrants treatment.          PLAN:      Treatment: prescription  for CPAP 6-18 cm with the mask of a patient's choice was given to the patient.    Education: During our discussion today, we talked about the etiology of obstructive sleep apnea as well as the potential ramifications of untreated sleep apnea, which could include daytime sleepiness, hypertension, heart disease and/or stroke.      We discussed potential treatment options, which could include weight loss, body positioning, continuous positive airway pressure (CPAP), or referral for surgical consideration. The patient preferred CPAP option.     Discussed purpose of PAP therapy, health benefits of CPAP, as well as the potential ramifications of untreated sleep apnea, which could include daytime sleepiness, hypertension, heart disease and/or stroke. An AHI of 15 is associated with increased risk CVD.     The patient should avoid ETOH and sedatives at night, as it tends to aggravate MAME. Regular replacement of CPAP mask, tubing and filter was recommended.    Precautions: The patient was advised to abstain from driving should he feel sleepy or drowsy.    Follow up: MD/NP after 1 month of PAP use.    Thank you for allowing me the opportunity to participate in the care of your patient.    "

## 2017-07-18 ENCOUNTER — TELEPHONE (OUTPATIENT)
Dept: CRITICAL CARE MEDICINE | Facility: HOSPITAL | Age: 52
End: 2017-07-18

## 2017-07-18 DIAGNOSIS — N52.01 ERECTILE DYSFUNCTION DUE TO ARTERIAL INSUFFICIENCY: Primary | ICD-10-CM

## 2017-07-18 DIAGNOSIS — D57.3 SICKLE-CELL TRAIT: ICD-10-CM

## 2017-07-18 NOTE — TELEPHONE ENCOUNTER
----- Message from Gabi Cartagena sent at 7/18/2017  9:05 AM CDT -----  Contact: Self  Pt was seen by Dr. Jackson on 7/13/17 in the ER and request she contact him regarding a procedure initiated on him; he believes the symptoms are coming back.  Pt says he wants to know why it's coming back and whether he needs to come in.    He can be reached at [phone].    Thank you.

## 2017-07-18 NOTE — TELEPHONE ENCOUNTER
Contacted patient who informed me that he again had been experiencing prolonged erection which was causing him significant pain. Patient just recently had this reduced by urology in the ED 5 days ago. Advised him to go back to the ED to have this reduced and to follow up with hematology for possible initiation of hydroxyurea. Will place heme/onc referral.

## 2017-07-20 ENCOUNTER — TELEPHONE (OUTPATIENT)
Dept: HEMATOLOGY/ONCOLOGY | Facility: CLINIC | Age: 52
End: 2017-07-20

## 2017-07-24 ENCOUNTER — HOSPITAL ENCOUNTER (OUTPATIENT)
Dept: RADIOLOGY | Facility: HOSPITAL | Age: 52
Discharge: HOME | End: 2017-07-24
Attending: INTERNAL MEDICINE
Payer: MEDICARE

## 2017-07-24 ENCOUNTER — OFFICE VISIT (OUTPATIENT)
Dept: INTERNAL MEDICINE | Facility: CLINIC | Age: 52
End: 2017-07-24
Payer: MEDICARE

## 2017-07-24 VITALS
DIASTOLIC BLOOD PRESSURE: 62 MMHG | BODY MASS INDEX: 30.81 KG/M2 | OXYGEN SATURATION: 97 % | WEIGHT: 227.5 LBS | HEIGHT: 72 IN | HEART RATE: 85 BPM | SYSTOLIC BLOOD PRESSURE: 120 MMHG

## 2017-07-24 DIAGNOSIS — M25.562 ACUTE PAIN OF LEFT KNEE: Primary | ICD-10-CM

## 2017-07-24 DIAGNOSIS — M25.562 ACUTE PAIN OF LEFT KNEE: ICD-10-CM

## 2017-07-24 PROCEDURE — 99213 OFFICE O/P EST LOW 20 MIN: CPT | Mod: S$GLB,,, | Performed by: PHYSICIAN ASSISTANT

## 2017-07-24 PROCEDURE — 73560 X-RAY EXAM OF KNEE 1 OR 2: CPT | Mod: TC,RT

## 2017-07-24 PROCEDURE — 99999 PR PBB SHADOW E&M-EST. PATIENT-LVL V: CPT | Mod: PBBFAC,,, | Performed by: PHYSICIAN ASSISTANT

## 2017-07-24 PROCEDURE — 73562 X-RAY EXAM OF KNEE 3: CPT | Mod: 26,LT,, | Performed by: RADIOLOGY

## 2017-07-24 PROCEDURE — 73560 X-RAY EXAM OF KNEE 1 OR 2: CPT | Mod: 26,59,RT, | Performed by: RADIOLOGY

## 2017-07-24 RX ORDER — HYDROCODONE BITARTRATE AND ACETAMINOPHEN 5; 325 MG/1; MG/1
TABLET ORAL
Qty: 20 TABLET | Refills: 0 | Status: SHIPPED | OUTPATIENT
Start: 2017-07-24 | End: 2017-08-01 | Stop reason: SDUPTHER

## 2017-07-24 NOTE — LETTER
July 24, 2017    Magdy Marsh  [address]  [place] [address]         [facility]  [address]  [facility] [address]  Phone: [phone]  Fax: [phone] July 24, 2017     Patient: Magdy Marsh   YOB: 1965   Date of Visit: 7/24/2017       To Whom It May Concern:    It is my medical opinion that Magdy Marsh should remain out of work until August 1, 2017.    If you have any questions or concerns, please don't hesitate to call.    Sincerely,        Toya Causey PA-C

## 2017-07-24 NOTE — PROGRESS NOTES
Subjective:       Patient ID: Magdy Marsh is a 52 y.o. male.        Chief Complaint: Knee Pain (severe knee pain radiating to back and thigh)    Magdy Marsh is an established patient of Rosa Elena Jackson MD here today for urgent care visit.    He is disabled but began to work again approximately 4-5 weeks ago.  After working x 3 weeks, he noticed some mild left knee pain that has gradually worsened.  1 week ago, the pain worsened to a more severe level, 10/10 pain level.  He gets relief in the pain upon lying down and keeping the knee flexed.  Straightening the knee and weight bearing worsen the pain.  No swelling or redness.  No acute injury.  He works at [employer] and is on his feet all day.  No numbness/tingling/weakness.      Cannot take NSAIDs secondary to CKD.           Review of Systems   Constitutional: Negative for appetite change, chills, fatigue and fever.   HENT: Negative for congestion and sore throat.    Eyes: Negative for visual disturbance.   Respiratory: Negative for cough, chest tightness and shortness of breath.    Cardiovascular: Negative for chest pain, palpitations and leg swelling.   Gastrointestinal: Negative for abdominal pain, blood in stool, constipation, diarrhea, nausea and vomiting.   Genitourinary: Negative for dysuria, frequency, hematuria and urgency.   Musculoskeletal: Positive for arthralgias (left knee pain). Negative for back pain.   Skin: Negative for rash.   Neurological: Negative for dizziness, syncope, weakness and headaches.   Psychiatric/Behavioral: Negative for dysphoric mood and sleep disturbance. The patient is not nervous/anxious.        Objective:      Physical Exam   Constitutional: He appears well-developed and well-nourished. No distress.   HENT:   Head: Normocephalic and atraumatic.   Right Ear: External ear normal.   Left Ear: External ear normal.   Neck: Neck supple.   Pulmonary/Chest: Effort normal.   Abdominal: Soft.   Musculoskeletal: He  "exhibits no edema.        Left knee: He exhibits normal range of motion (pain with extension) and no erythema. Tenderness (crepitus noted) found. Patellar tendon tenderness noted. No medial joint line, no lateral joint line, no MCL and no LCL tenderness noted.   Neurological: He is alert.   Skin: Skin is warm and dry. He is not diaphoretic.   Psychiatric: He has a normal mood and affect.       Assessment:       1. Acute pain of left knee        Plan:       Magdy was seen today for knee pain.    Diagnoses and all orders for this visit:    Acute pain of left knee  -     X-ray Knee Ortho Left; Future  -     Ambulatory consult to Orthopedics    Rx for Savery 5/325 #20 tablets to take as needed written by Dr. Alvarenga at my request to use prn pain until he is able to see orthopedics next week.  Work note written as staying off the knee really helps the pain.  Rest, ice, elevation, f/u orthopedics next week.      Pt has been given instructions populated from [employer] database and has verbalized understanding of the after visit summary and information contained wherein.    Follow up with a primary care provider. May go to ER for acute shortness of breath, lightheadedness, fever, or any other emergent complaints or changes in condition.    "This note will be shared with the patient"    Future Appointments  Date Time Provider Department Center   7/26/2017 1:00 PM Андрей Yi MD [facility] BM [facility]   7/31/2017 3:45 PM Neena Masters NP [facility] ORTHO [facility]   9/19/2017 9:00 AM [facility], [place] [facility] [place]   9/20/2017 8:30 AM Wolfgang Shannon MD [facility] UROLOGY [facility]   9/20/2017 2:30 PM Gina Anthony MD [facility] SLEEP [place]               "

## 2017-07-24 NOTE — PATIENT INSTRUCTIONS
Knee Pain  Knee pain is very common. Its especially common in active people who put a lot of pressure on their knees, like runners. It affects women more often than men.  Your kneecap (patella) is a thick, round bone. It covers and protects the front portion of your knee joint. It moves along a groove in your thighbone (femur) as part of the patellofemoral joint. A layer of cartilage surrounds the underside of your kneecap. This layer protects it from grinding against your femur.  When this cartilage softens and breaks down, it can cause knee pain. This is partly because of repetitive stress. The stress irritates the lining of the joint. This causes pain in the underlying bone.  What causes knee pain?  Many things can cause knee pain. You may have more than one cause. Some of these include:  · Overuse of the knee joint  · The kneecap doesnt line up with the tissue around it  · Damage to small nerves in the area  · Damage to the ligament-like structure that holds the kneecap in place (retinaculum)  · Breakdown of the bone under the cartilage  · Swelling in the soft tissues around the kneecap  · Injury  You might be more likely to have knee pain if you:  · Exercise a lot  · Recently increased the intensity of your workouts  · Have a body mass index (BMI) greater than 25  · Have poor alignment of your kneecap  · Walk with your feet turned overly outward or inward  · Have weakness in surrounding muscle groups (inner quad or hip adductor muscles)  · Have too much tightness in surrounding muscle groups (hamstrings or iliotibial band)  · Have a recent history of injury to the area  · Are female  Symptoms of knee pain  This type of knee pain is a dull, aching pain in the front of the knee in the area under and around the kneecap. This pain may start quickly or slowly. Your pain might be worse when you squat, run, or sit for a long time. You might also sometimes feel like your knee is giving out. You may have symptoms in  one or both of your knees.  Diagnosing knee pain  Your health care provider will ask about your medical history and your symptoms. Be sure to describe any activities that make your knee pain worse. He or she will look at your knee. This will include tests of your range of motion, strength, and areas of pain of your knee. Your knee alignment will be checked.  Your health care provider will need to rule out other causes of your knee pain, such as arthritis. You may need an imaging test, such as an X-ray or MRI.  Treatment for knee pain  Treatments that can help ease your symptoms may include:  · Avoiding activities for a while that make your pain worse, returning to activity over time  · Icing the outside of your knee when it causes you pain  · Taking over-the-counter pain medicine  · Wearing a knee brace or taping your knee to support it  · Wearing special shoe inserts to help keep your feet in the proper alignment  · Doing special exercises to stretch and strengthen the muscles around your hip and your knee  These steps help most people manage knee pain. But some cases of knee pain need to be treated with surgery. You may need surgery right away. Or you may need it later if other treatments dont work. Your health care provider may refer you to an orthopedic surgeon. He or she will talk with you about your choices.  Preventing knee pain  Losing weight and correcting excess muscle tightness or muscle weakness may help lower your risk.  In some cases, you can prevent knee pain. To help prevent a flare-up of knee pain, you do these things:  · Regularly do all the exercises your doctor or physical therapist advises  · Support your knee as advised by your doctor or physical therapist  · Increase training gradually, and ease up on training when needed  · Have an expert check your gait for running or other sporting activities  · Stretch properly before and after exercise  · Replace your running shoes regularly  · Lose  excess weight     When to call your health care provider  Call your health care provider right away if:  · Your symptoms dont get better after a few weeks of treatment  · You have any new symptoms   Date Last Reviewed: 3/19/2015  © 8029-7084 [employer]. [address] [place] [address], [place] [address]. All rights reserved. This information is not intended as a substitute for professional medical care. Always follow your healthcare professional's instructions.

## 2017-07-31 ENCOUNTER — TELEPHONE (OUTPATIENT)
Dept: SLEEP MEDICINE | Facility: CLINIC | Age: 52
End: 2017-07-31

## 2017-07-31 NOTE — TELEPHONE ENCOUNTER
----- Message from Iram Ferguson sent at 7/31/2017  9:28 AM CDT -----  Contact: Self  Pt needs to know when he is getting his sleep study machine   Pt can be reached at  [phone]     Thanks

## 2017-08-01 ENCOUNTER — PATIENT MESSAGE (OUTPATIENT)
Dept: INTERNAL MEDICINE | Facility: CLINIC | Age: 52
End: 2017-08-01

## 2017-08-01 ENCOUNTER — OFFICE VISIT (OUTPATIENT)
Dept: INTERNAL MEDICINE | Facility: CLINIC | Age: 52
End: 2017-08-01
Payer: MEDICARE

## 2017-08-01 ENCOUNTER — TELEPHONE (OUTPATIENT)
Dept: INTERNAL MEDICINE | Facility: CLINIC | Age: 52
End: 2017-08-01

## 2017-08-01 ENCOUNTER — LAB VISIT (OUTPATIENT)
Dept: LAB | Facility: HOSPITAL | Age: 52
End: 2017-08-01
Attending: INTERNAL MEDICINE
Payer: MEDICAID

## 2017-08-01 VITALS
OXYGEN SATURATION: 97 % | HEIGHT: 72 IN | SYSTOLIC BLOOD PRESSURE: 116 MMHG | DIASTOLIC BLOOD PRESSURE: 78 MMHG | HEART RATE: 70 BPM | WEIGHT: 227.5 LBS | BODY MASS INDEX: 30.81 KG/M2

## 2017-08-01 DIAGNOSIS — E23.0 PANHYPOPITUITARISM: Chronic | ICD-10-CM

## 2017-08-01 DIAGNOSIS — I10 ESSENTIAL HYPERTENSION: ICD-10-CM

## 2017-08-01 DIAGNOSIS — D57.3 SICKLE-CELL TRAIT: ICD-10-CM

## 2017-08-01 DIAGNOSIS — N13.8 BPH WITH URINARY OBSTRUCTION: ICD-10-CM

## 2017-08-01 DIAGNOSIS — N18.30 CKD (CHRONIC KIDNEY DISEASE) STAGE 3, GFR 30-59 ML/MIN: ICD-10-CM

## 2017-08-01 DIAGNOSIS — N40.1 BPH WITH URINARY OBSTRUCTION: ICD-10-CM

## 2017-08-01 DIAGNOSIS — M25.562 ACUTE PAIN OF LEFT KNEE: Primary | ICD-10-CM

## 2017-08-01 LAB
25(OH)D3+25(OH)D2 SERPL-MCNC: 16 NG/ML
ALBUMIN SERPL BCP-MCNC: 3.7 G/DL
ANION GAP SERPL CALC-SCNC: 9 MMOL/L
BASOPHILS # BLD AUTO: 0.08 K/UL
BASOPHILS NFR BLD: 1.1 %
BUN SERPL-MCNC: 21 MG/DL
CALCIUM SERPL-MCNC: 9.4 MG/DL
CHLORIDE SERPL-SCNC: 104 MMOL/L
CO2 SERPL-SCNC: 25 MMOL/L
CREAT SERPL-MCNC: 1.5 MG/DL
DIFFERENTIAL METHOD: ABNORMAL
EOSINOPHIL # BLD AUTO: 0.2 K/UL
EOSINOPHIL NFR BLD: 2.5 %
ERYTHROCYTE [DISTWIDTH] IN BLOOD BY AUTOMATED COUNT: 15.9 %
EST. GFR  (AFRICAN AMERICAN): >60 ML/MIN/1.73 M^2
EST. GFR  (NON AFRICAN AMERICAN): 53.1 ML/MIN/1.73 M^2
GLUCOSE SERPL-MCNC: 111 MG/DL
HCT VFR BLD AUTO: 39 %
HGB BLD-MCNC: 13.6 G/DL
LYMPHOCYTES # BLD AUTO: 2.5 K/UL
LYMPHOCYTES NFR BLD: 35.1 %
MCH RBC QN AUTO: 23.7 PG
MCHC RBC AUTO-ENTMCNC: 34.9 G/DL
MCV RBC AUTO: 68 FL
MONOCYTES # BLD AUTO: 0.7 K/UL
MONOCYTES NFR BLD: 9.4 %
NEUTROPHILS # BLD AUTO: 3.7 K/UL
NEUTROPHILS NFR BLD: 51.8 %
PHOSPHATE SERPL-MCNC: 3.5 MG/DL
PLATELET # BLD AUTO: 314 K/UL
PMV BLD AUTO: 9.7 FL
POTASSIUM SERPL-SCNC: 4.6 MMOL/L
PTH-INTACT SERPL-MCNC: 52 PG/ML
RBC # BLD AUTO: 5.75 M/UL
SODIUM SERPL-SCNC: 138 MMOL/L
URATE SERPL-MCNC: 6.9 MG/DL
WBC # BLD AUTO: 7.16 K/UL

## 2017-08-01 PROCEDURE — 80069 RENAL FUNCTION PANEL: CPT

## 2017-08-01 PROCEDURE — 86803 HEPATITIS C AB TEST: CPT

## 2017-08-01 PROCEDURE — 83970 ASSAY OF PARATHORMONE: CPT

## 2017-08-01 PROCEDURE — 82306 VITAMIN D 25 HYDROXY: CPT

## 2017-08-01 PROCEDURE — 84165 PROTEIN E-PHORESIS SERUM: CPT

## 2017-08-01 PROCEDURE — 84550 ASSAY OF BLOOD/URIC ACID: CPT

## 2017-08-01 PROCEDURE — 86334 IMMUNOFIX E-PHORESIS SERUM: CPT

## 2017-08-01 PROCEDURE — 85025 COMPLETE CBC W/AUTO DIFF WBC: CPT

## 2017-08-01 PROCEDURE — 87340 HEPATITIS B SURFACE AG IA: CPT

## 2017-08-01 PROCEDURE — 84165 PROTEIN E-PHORESIS SERUM: CPT | Mod: 26,,, | Performed by: PATHOLOGY

## 2017-08-01 PROCEDURE — 99999 PR PBB SHADOW E&M-EST. PATIENT-LVL IV: CPT | Mod: PBBFAC,,, | Performed by: NURSE PRACTITIONER

## 2017-08-01 PROCEDURE — 86038 ANTINUCLEAR ANTIBODIES: CPT

## 2017-08-01 PROCEDURE — 86255 FLUORESCENT ANTIBODY SCREEN: CPT

## 2017-08-01 PROCEDURE — 86334 IMMUNOFIX E-PHORESIS SERUM: CPT | Mod: 26,,, | Performed by: PATHOLOGY

## 2017-08-01 PROCEDURE — 99213 OFFICE O/P EST LOW 20 MIN: CPT | Mod: S$GLB,,, | Performed by: NURSE PRACTITIONER

## 2017-08-01 PROCEDURE — 99214 OFFICE O/P EST MOD 30 MIN: CPT | Mod: PBBFAC | Performed by: NURSE PRACTITIONER

## 2017-08-01 RX ORDER — ATORVASTATIN CALCIUM 80 MG/1
80 TABLET, FILM COATED ORAL DAILY
COMMUNITY
End: 2017-08-16 | Stop reason: SDUPTHER

## 2017-08-01 RX ORDER — NAPROXEN SODIUM 220 MG/1
81 TABLET, FILM COATED ORAL DAILY
Refills: 7 | COMMUNITY
Start: 2017-06-20 | End: 2017-08-15 | Stop reason: SDUPTHER

## 2017-08-01 RX ORDER — HYDROCODONE BITARTRATE AND ACETAMINOPHEN 5; 325 MG/1; MG/1
TABLET ORAL
Qty: 10 TABLET | Refills: 0 | Status: SHIPPED | OUTPATIENT
Start: 2017-08-01 | End: 2017-08-01 | Stop reason: SDUPTHER

## 2017-08-01 RX ORDER — HYDROCODONE BITARTRATE AND ACETAMINOPHEN 5; 325 MG/1; MG/1
TABLET ORAL
Qty: 10 TABLET | Refills: 0 | Status: SHIPPED | OUTPATIENT
Start: 2017-08-01 | End: 2017-08-04 | Stop reason: SDUPTHER

## 2017-08-01 RX ORDER — RIVAROXABAN 20 MG/1
TABLET, FILM COATED ORAL
Refills: 1 | COMMUNITY
Start: 2017-07-12 | End: 2017-08-15 | Stop reason: SDUPTHER

## 2017-08-01 NOTE — TELEPHONE ENCOUNTER
Spoke to pt, informed pt that he would have to wait due to being late for his 10:30 appt, pt verbalized understanding.

## 2017-08-01 NOTE — PROGRESS NOTES
INTERNAL MEDICINE PROGRESS/URGENT CARE NOTE    CHIEF COMPLAINT     Chief Complaint   Patient presents with    Annual Exam    Establish Care    Knee Pain     L. knee        HPI     Valariehy Chance Marsh is a 52 y.o. male who presents for an urgent visit today.  He is an established patient of Dr. Jackson.   Pt is s/p pituitary resection for tumor, macroadenoma, surgery complicated by CSF leak, and pateitn required cardiac resusitation  And did have nonoliguric KUNAL,  now with pan hypopituitarism , including central DI, hypothyroidism, hypo cortisol, hypogonadism,  sickle cell trait, with a possible CVA     Followed by Nephrology - CKD stage III - has out standing labs to be performed for Dr. Mansfield.     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 Has upcoming appointment with Hematology     Pt is here with continued c/o Left knee pain- missed apt with Ortho. States he was at work on a ladder missed step and struck left knee. Pain with flexion and extension. Was seen in UC by Toya. Was given 20 norco and was to f/u with ortho. Pt is out of pain meds. Xray of the knee - normal.         Past Medical History:  Past Medical History:   Diagnosis Date    Adrenal insufficiency     BPH (benign prostatic hyperplasia)     CKD (chronic kidney disease), stage III     Depression     History of pituitary neoplasm 6/13/2017    History of priapism 6/13/2017    History of stroke 6/13/2017    Hypertension     Impaired fasting glucose     Low testosterone level in male     Thyroid disease        Home Medications:  Prior to Admission medications    Medication Sig Start Date End Date Taking? Authorizing Provider   aspirin 81 MG Chew Take 81 mg by mouth once daily. 6/20/17  Yes Historical Provider, MD   atorvastatin (LIPITOR) 80 MG tablet Take 80 mg by mouth once daily.   Yes Historical Provider, MD   desmopressin (DDAVP) 0.1 MG Tab Take 50 mcg by mouth 2 (two) times daily.   Yes Historical  "Provider, MD   hydrocortisone (CORTEF) 5 MG Tab Take 5 mg by mouth once daily.   Yes Historical Provider, MD   lamotrigine (LAMICTAL) 25 MG tablet Take 25 mg by mouth once daily.   Yes Historical Provider, MD   levothyroxine (SYNTHROID) 25 MCG tablet Take 25 mcg by mouth once daily.   Yes Historical Provider, MD   needle, disp, 21 G 21 x 1 1/2 " Ndle To draw up testosterone 6/20/17  Yes Kathy Du MD   omeprazole (PRILOSEC) 40 MG capsule Take 1 capsule (40 mg total) by mouth once daily. 2/7/17  Yes Rosa Elena Jackson MD   paroxetine (PAXIL) 40 MG tablet Take 1 tablet (40 mg total) by mouth every morning. 2/7/17 2/7/18 Yes Rosa Elena Jackson MD   quetiapine (SEROQUEL) 400 MG tablet Take 400 mg by mouth every evening.   Yes Historical Provider, MD   sildenafil (VIAGRA) 100 MG tablet Take 1 tablet (100 mg total) by mouth daily as needed for Erectile Dysfunction. Take 1 hour before intercourse. 6/21/17  Yes Wolfgang Shannon MD   syringe, disposable, 3 mL Syrg For twice monthly injections 6/20/17  Yes Kathy Du MD   testosterone cypionate (DEPOTESTOTERONE CYPIONATE) 200 mg/mL injection Inject 1 mL (200 mg total) into the muscle every 14 (fourteen) days. 6/20/17  Yes Kathy Du MD   XARELTO 20 mg Tab TAKE 1 TABLET (20 MG) BY MOUTH ONCE DAILY WITH THE EVENING MEAL 7/12/17  Yes Historical Provider, MD   hydrocodone-acetaminophen 5-325mg (NORCO) 5-325 mg per tablet 1-2 tabs po q 6-8 h prn pain 7/24/17   Dixie Alvarenga MD       Review of Systems:  Review of Systems   Respiratory: Negative for cough, shortness of breath and wheezing.    Cardiovascular: Negative for chest pain, palpitations and leg swelling.   Gastrointestinal: Negative for abdominal pain, blood in stool, constipation, nausea and rectal pain.   Genitourinary: Negative for dysuria, frequency and urgency.   Musculoskeletal: Positive for arthralgias, gait problem and joint swelling.   Neurological: Negative for dizziness, light-headedness and headaches. "       Health Maintainence:   Immunizations:  Health Maintenance       Date Due Completion Date    Lipid Panel 1966 ---    Pneumococcal PCV13 (High Risk) (1 - PCV13 Required) 02/05/1967 ---    TETANUS VACCINE 02/05/1984 ---    Pneumococcal PPSV23 (High Risk) (1) 02/05/1984 ---    Colonoscopy 02/05/2016 ---    Influenza Vaccine 08/01/2017 ---           PHYSICAL EXAM     /78 (BP Location: Left arm, Patient Position: Sitting, BP Method: Manual)   Pulse 70   Ht 6' (1.829 m)   Wt 103.2 kg (227 lb 8.2 oz)   SpO2 97%   BMI 30.86 kg/m²     Physical Exam   Constitutional: He is oriented to person, place, and time. He appears well-developed and well-nourished.   HENT:   Head: Normocephalic and atraumatic.   Eyes: Pupils are equal, round, and reactive to light.   Cardiovascular: Normal rate and regular rhythm.    Pulmonary/Chest: Effort normal.   Musculoskeletal:        Left knee: He exhibits bony tenderness. He exhibits normal range of motion, no swelling, no effusion, no deformity, no laceration, no erythema, normal alignment, no LCL laxity, normal meniscus and no MCL laxity. Tenderness found. Lateral joint line tenderness noted.   Neurological: He is alert and oriented to person, place, and time.   Psychiatric: He has a normal mood and affect.   Vitals reviewed.      LABS     No results found for: LABA1C, HGBA1C  CMP  Sodium   Date Value Ref Range Status   07/13/2017 139 136 - 145 mmol/L Final     Potassium   Date Value Ref Range Status   07/13/2017 3.6 3.5 - 5.1 mmol/L Final     Comment:     *Slightly Hemolyzed     Chloride   Date Value Ref Range Status   07/13/2017 109 95 - 110 mmol/L Final     CO2   Date Value Ref Range Status   07/13/2017 24 23 - 29 mmol/L Final     Glucose   Date Value Ref Range Status   07/13/2017 155 (H) 70 - 110 mg/dL Final     BUN, Bld   Date Value Ref Range Status   07/13/2017 10 6 - 20 mg/dL Final     Creatinine   Date Value Ref Range Status   07/13/2017 1.3 0.5 - 1.4 mg/dL Final      Calcium   Date Value Ref Range Status   07/13/2017 8.8 8.7 - 10.5 mg/dL Final     Total Protein   Date Value Ref Range Status   07/13/2017 6.5 6.0 - 8.4 g/dL Final     Albumin   Date Value Ref Range Status   07/13/2017 3.1 (L) 3.5 - 5.2 g/dL Final     Total Bilirubin   Date Value Ref Range Status   07/13/2017 0.3 0.1 - 1.0 mg/dL Final     Comment:     For infants and newborns, interpretation of results should be based  on gestational age, weight and in agreement with clinical  observations.  Premature Infant recommended reference ranges:  Up to 24 hours.............<8.0 mg/dL  Up to 48 hours............<12.0 mg/dL  3-5 days..................<15.0 mg/dL  6-29 days.................<15.0 mg/dL       Alkaline Phosphatase   Date Value Ref Range Status   07/13/2017 73 55 - 135 U/L Final     AST   Date Value Ref Range Status   07/13/2017 38 10 - 40 U/L Final     ALT   Date Value Ref Range Status   07/13/2017 19 10 - 44 U/L Final     Anion Gap   Date Value Ref Range Status   07/13/2017 6 (L) 8 - 16 mmol/L Final     eGFR if    Date Value Ref Range Status   07/13/2017 >60.0 >60 mL/min/1.73 m^2 Final     eGFR if non    Date Value Ref Range Status   07/13/2017 >60.0 >60 mL/min/1.73 m^2 Final     Comment:     Calculation used to obtain the estimated glomerular filtration  rate (eGFR) is the CKD-EPI equation. Since race is unknown   in our information system, the eGFR values for   -American and Non--American patients are given   for each creatinine result.       Lab Results   Component Value Date    WBC 6.27 07/13/2017    HGB 12.3 (L) 07/13/2017    HCT 34.8 (L) 07/13/2017    MCV 67 (L) 07/13/2017     07/13/2017     No results found for: CHOL  No results found for: HDL  No results found for: LDLCALC  No results found for: TRIG  No results found for: CHOLHDL  Lab Results   Component Value Date    TSH <0.010 (L) 02/03/2017       ASSESSMENT/PLAN     Magdy Marsh is a  52 y.o. male with  Past Medical History:   Diagnosis Date    Adrenal insufficiency     BPH (benign prostatic hyperplasia)     CKD (chronic kidney disease), stage III     Depression     History of pituitary neoplasm 6/13/2017    History of priapism 6/13/2017    History of stroke 6/13/2017    Hypertension     Impaired fasting glucose     Low testosterone level in male     Thyroid disease      Acute pain of left knee- discussed results of the knee xary, will given 10 pills to last until the appointment. Stated I will not fill again.   -     hydrocodone-acetaminophen 5-325mg (NORCO) 5-325 mg per tablet; 1-2 tabs po q 6-8 h prn pain  Dispense: 10 tablet; Refill: 0    Panhypopituitarism- stable. Will schedule for labs today. Cont cortef and desmopressin and synthroid as directed.     Essential hypertension- at gaol/controlled. Will cont lifestyle management, low Na diet.     CKD (chronic kidney disease) stage 3, GFR 30-59 ml/min- Cont BP control. F/u with nephrology as directed.     Sickle-cell trait- has apt this week for heme/onc.         Follow up with PCP Dr. Jackson in 1 month     Patient education provided from [facility]. Patient was counseled on when and how to seek emergent care.       Kamini WITT, APRN, FNP-c   Department of Internal Medicine - [facility]  12:01 PM

## 2017-08-02 ENCOUNTER — PATIENT MESSAGE (OUTPATIENT)
Dept: NEPHROLOGY | Facility: CLINIC | Age: 52
End: 2017-08-02

## 2017-08-02 LAB
ALBUMIN SERPL ELPH-MCNC: 3.98 G/DL
ALPHA1 GLOB SERPL ELPH-MCNC: 0.3 G/DL
ALPHA2 GLOB SERPL ELPH-MCNC: 0.76 G/DL
ANA SER QL IF: NORMAL
B-GLOBULIN SERPL ELPH-MCNC: 0.96 G/DL
GAMMA GLOB SERPL ELPH-MCNC: 1.2 G/DL
HBV SURFACE AG SERPL QL IA: NEGATIVE
HCV AB SERPL QL IA: NEGATIVE
INTERPRETATION SERPL IFE-IMP: NORMAL
PATHOLOGIST INTERPRETATION IFE: NORMAL
PATHOLOGIST INTERPRETATION SPE: NORMAL
PROT SERPL-MCNC: 7.2 G/DL

## 2017-08-02 RX ORDER — ERGOCALCIFEROL 1.25 MG/1
50000 CAPSULE ORAL
Qty: 8 CAPSULE | Refills: 1 | Status: SHIPPED | OUTPATIENT
Start: 2017-08-02 | End: 2017-08-14 | Stop reason: SDUPTHER

## 2017-08-02 NOTE — PROGRESS NOTES
No hematuria noted on UA,    sickle cell trait may cause hematuria and isosthenuria, and predispose to KUNAL at time of hypotension and dehydration.

## 2017-08-03 LAB
ANCA AB TITR SER IF: NORMAL TITER
P-ANCA TITR SER IF: NORMAL TITER

## 2017-08-04 DIAGNOSIS — M25.562 ACUTE PAIN OF LEFT KNEE: ICD-10-CM

## 2017-08-04 DIAGNOSIS — N18.30 CKD (CHRONIC KIDNEY DISEASE) STAGE 3, GFR 30-59 ML/MIN: Primary | ICD-10-CM

## 2017-08-04 RX ORDER — HYDROCODONE BITARTRATE AND ACETAMINOPHEN 5; 325 MG/1; MG/1
TABLET ORAL
Qty: 28 TABLET | Refills: 0 | Status: SHIPPED | OUTPATIENT
Start: 2017-08-04 | End: 2017-08-04 | Stop reason: SDUPTHER

## 2017-08-04 RX ORDER — HYDROCODONE BITARTRATE AND ACETAMINOPHEN 5; 325 MG/1; MG/1
TABLET ORAL
Qty: 28 TABLET | Refills: 0 | Status: SHIPPED | OUTPATIENT
Start: 2017-08-04 | End: 2017-08-15

## 2017-08-04 NOTE — TELEPHONE ENCOUNTER
"Ortho appt made 8/15 at 6:15PM and pt is requesting more NORCO until his ortho appt, pt states that he only has "2 pills left and he is about to take them now"   "

## 2017-08-04 NOTE — TELEPHONE ENCOUNTER
----- Message from Jessica Marshall sent at 8/4/2017 11:15 AM CDT -----  Contact: self  Pt called in about wanting to speak with Dr asap. Pt didn't leave any details      Pt can be reached at [phone]        Thank You

## 2017-08-04 NOTE — TELEPHONE ENCOUNTER
I believe he is referring to the orthopedic appointment that he missed. Can we have him or us set that up asap? There was already a referral placed by Toya therefore I did not order it at our visit.   Thanks,   Kamini

## 2017-08-04 NOTE — TELEPHONE ENCOUNTER
"Pt still having pain in knee and states that "no one has contacted him about setting up an appt, "medication(NORCO) not working and almost fell because knee went out on him". Pt is requesting advice and/or medication help ease the pain, please advise.    Do you know who was suppose to be contacting the pt to assist in setting up the appt he is referring to?  "

## 2017-08-04 NOTE — TELEPHONE ENCOUNTER
----- Message from Kendal Doe sent at 8/4/2017  2:44 PM CDT -----  Contact: Pt Magdy Marsh [phone]  Pt is calling regarding his Norco Rx.  It was sent to [facility] in [place]. Pt states that he had asked for it to be sent to [facility] on [address].    Phone [phone]  Fax [phone]     Pt asks that the one at [facility] be canceled and then sent to [facility].    Please call pt once that has been done. Pt may be reached at [phone].    Thank you.  LC

## 2017-08-04 NOTE — TELEPHONE ENCOUNTER
----- Message from Annamaria Valdez sent at 8/4/2017 12:00 PM CDT -----  Contact: self  Pt would like a call back as soon as possible. Pt didn't want to leave a detailed message.    Pt can be reached at [phone].    Thank you.

## 2017-08-08 DIAGNOSIS — N18.30 CKD (CHRONIC KIDNEY DISEASE) STAGE 3, GFR 30-59 ML/MIN: Primary | ICD-10-CM

## 2017-08-10 ENCOUNTER — TELEPHONE (OUTPATIENT)
Dept: SLEEP MEDICINE | Facility: CLINIC | Age: 52
End: 2017-08-10

## 2017-08-14 ENCOUNTER — PATIENT MESSAGE (OUTPATIENT)
Dept: ORTHOPEDICS | Facility: CLINIC | Age: 52
End: 2017-08-14

## 2017-08-14 ENCOUNTER — PATIENT MESSAGE (OUTPATIENT)
Dept: ENDOCRINOLOGY | Facility: CLINIC | Age: 52
End: 2017-08-14

## 2017-08-14 ENCOUNTER — PATIENT MESSAGE (OUTPATIENT)
Dept: INTERNAL MEDICINE | Facility: CLINIC | Age: 52
End: 2017-08-14

## 2017-08-14 ENCOUNTER — PATIENT MESSAGE (OUTPATIENT)
Dept: NEPHROLOGY | Facility: CLINIC | Age: 52
End: 2017-08-14

## 2017-08-14 DIAGNOSIS — N18.30 CHRONIC KIDNEY DISEASE, STAGE III (MODERATE): Primary | ICD-10-CM

## 2017-08-14 DIAGNOSIS — N52.01 ERECTILE DYSFUNCTION DUE TO ARTERIAL INSUFFICIENCY: Primary | ICD-10-CM

## 2017-08-14 RX ORDER — TESTOSTERONE CYPIONATE 200 MG/ML
200 INJECTION, SOLUTION INTRAMUSCULAR
Qty: 10 ML | Refills: 4 | Status: CANCELLED | OUTPATIENT
Start: 2017-08-14

## 2017-08-14 RX ORDER — SYRINGE, DISPOSABLE, 3 ML
SYRINGE, EMPTY DISPOSABLE MISCELLANEOUS
Qty: 25 EACH | Refills: 11 | Status: CANCELLED | OUTPATIENT
Start: 2017-08-14

## 2017-08-14 RX ORDER — ERGOCALCIFEROL 1.25 MG/1
50000 CAPSULE ORAL
Qty: 8 CAPSULE | Refills: 1 | Status: SHIPPED | OUTPATIENT
Start: 2017-08-14

## 2017-08-15 DIAGNOSIS — M25.562 ACUTE PAIN OF LEFT KNEE: ICD-10-CM

## 2017-08-15 RX ORDER — HYDROCODONE BITARTRATE AND ACETAMINOPHEN 5; 325 MG/1; MG/1
TABLET ORAL
Qty: 28 TABLET | Refills: 0 | OUTPATIENT
Start: 2017-08-15 | End: 2017-08-26

## 2017-08-15 RX ORDER — TESTOSTERONE CYPIONATE 200 MG/ML
200 INJECTION, SOLUTION INTRAMUSCULAR
Qty: 10 ML | Refills: 4 | Status: SHIPPED | OUTPATIENT
Start: 2017-08-15 | End: 2017-08-17 | Stop reason: SDUPTHER

## 2017-08-15 RX ORDER — LAMOTRIGINE 25 MG/1
25 TABLET ORAL DAILY
Qty: 30 TABLET | Refills: 5 | Status: SHIPPED | OUTPATIENT
Start: 2017-08-15 | End: 2017-08-16 | Stop reason: SDUPTHER

## 2017-08-15 RX ORDER — DESMOPRESSIN ACETATE 0.1 MG/1
100 TABLET ORAL 2 TIMES DAILY
Qty: 60 TABLET | Refills: 1 | Status: SHIPPED | OUTPATIENT
Start: 2017-08-15

## 2017-08-15 RX ORDER — RIVAROXABAN 20 MG/1
TABLET, FILM COATED ORAL
Qty: 30 TABLET | Refills: 5 | Status: SHIPPED | OUTPATIENT
Start: 2017-08-15

## 2017-08-15 RX ORDER — NAPROXEN SODIUM 220 MG/1
81 TABLET, FILM COATED ORAL DAILY
Refills: 7 | COMMUNITY
Start: 2017-08-15

## 2017-08-15 RX ORDER — SYRINGE, DISPOSABLE, 3 ML
SYRINGE, EMPTY DISPOSABLE MISCELLANEOUS
Qty: 25 EACH | Refills: 11 | Status: SHIPPED | OUTPATIENT
Start: 2017-08-15 | End: 2017-08-16 | Stop reason: SDUPTHER

## 2017-08-15 NOTE — TELEPHONE ENCOUNTER
Scripts for everything except hydrocodone were sent to [facility] in [facility]. Sent to pharmacy on [address].

## 2017-08-16 ENCOUNTER — TELEPHONE (OUTPATIENT)
Dept: PULMONOLOGY | Facility: HOSPITAL | Age: 52
End: 2017-08-16

## 2017-08-16 DIAGNOSIS — N52.01 ERECTILE DYSFUNCTION DUE TO ARTERIAL INSUFFICIENCY: ICD-10-CM

## 2017-08-16 RX ORDER — LAMOTRIGINE 25 MG/1
25 TABLET ORAL DAILY
Qty: 30 TABLET | Refills: 5 | Status: SHIPPED | OUTPATIENT
Start: 2017-08-16

## 2017-08-16 RX ORDER — LEVOTHYROXINE SODIUM 25 UG/1
25 TABLET ORAL DAILY
Qty: 90 TABLET | Refills: 0 | Status: SHIPPED | OUTPATIENT
Start: 2017-08-16

## 2017-08-16 RX ORDER — OMEPRAZOLE 40 MG/1
40 CAPSULE, DELAYED RELEASE ORAL DAILY
Qty: 30 CAPSULE | Refills: 2 | Status: SHIPPED | OUTPATIENT
Start: 2017-08-16 | End: 2018-02-21 | Stop reason: SDUPTHER

## 2017-08-16 RX ORDER — ATORVASTATIN CALCIUM 80 MG/1
80 TABLET, FILM COATED ORAL DAILY
Qty: 90 TABLET | Refills: 1 | Status: SHIPPED | OUTPATIENT
Start: 2017-08-16

## 2017-08-16 RX ORDER — SYRINGE, DISPOSABLE, 3 ML
SYRINGE, EMPTY DISPOSABLE MISCELLANEOUS
Qty: 25 EACH | Refills: 11 | Status: SHIPPED | OUTPATIENT
Start: 2017-08-16

## 2017-08-16 RX ORDER — HYDROCORTISONE 5 MG/1
5 TABLET ORAL DAILY
Qty: 90 TABLET | Refills: 0 | Status: SHIPPED | OUTPATIENT
Start: 2017-08-16

## 2017-08-17 ENCOUNTER — TELEPHONE (OUTPATIENT)
Dept: INTERNAL MEDICINE | Facility: CLINIC | Age: 52
End: 2017-08-17

## 2017-08-17 RX ORDER — TESTOSTERONE CYPIONATE 200 MG/ML
200 INJECTION, SOLUTION INTRAMUSCULAR
Qty: 10 ML | Refills: 4 | Status: SHIPPED | OUTPATIENT
Start: 2017-08-17

## 2017-08-17 NOTE — TELEPHONE ENCOUNTER
Pt requesting 2-3 months of norco called in to a pharmacy in [place].   On two occasions I advised him that I would fill until seen by orthopedics but he has canceled or missed both appointments.   Advised pt that I will no longer fill narcotic pain meds.   He may establish with a provider there and start treatment.   Pt verbalizes understanding.

## 2017-08-17 NOTE — TELEPHONE ENCOUNTER
Hi, we cannot send in that prescription, Dr. Du rxed the med in June, Dr. Rosa Elena Jackson MD did not rx this medicine.  Thank you, Steve Mensah

## 2017-08-17 NOTE — TELEPHONE ENCOUNTER
"Have called patient and informed him that I cannot prescribe his testosterone and hydrocodone given that they are controlled substances. Have messaged both original prescribing providers regarding refilling these medications.     ----- Message from Iram Ferguson sent at 8/17/2017 10:39 AM CDT -----  Contact: Pt  Sorry to send this message directly to your inbox, but I did not have any other way to get the message to you      Pt does not have all his medication and will be in [place]     Pt says he need to get that sent to the pharmacist     aspirin 81 MG Chew says he cannot take the chewable    Pt says these has not been received at the pharmacy    testosterone cypionate (DEPOTESTOTERONE CYPIONATE) 200 mg/mL injection  needle, disp, 21 G 21 gauge x 1 1/2" Ndle  The 18 gauge also and his pain pills    Says he is not going to be back up November  Pt needs to get his medication to go to [address] [place] [id number]    Pt is requesting a callback ASAP [phone]    Thanks    "

## 2017-08-17 NOTE — TELEPHONE ENCOUNTER
----- Message from Tiesha Mitchell sent at 8/17/2017 11:23 AM CDT -----  Contact: Pt  Pt is calling for medication refill on pain medication and can be reached at [phone].  Thank you

## 2017-08-17 NOTE — TELEPHONE ENCOUNTER
"----- Message from Deepti Galeano sent at 8/17/2017 11:28 AM CDT -----  Contact: Patient  Requesting a Rx refill for Rx's    needle, disp, 21 G 21 gauge x 1 1/2" Ndle  testosterone cypionate (DEPOTESTOTERONE CYPIONATE) 200 mg/mL injection  syringe, disposable, 3 mL Syrg    Please send Rx to [facility] [phone] (Phone)  [phone] (fax)    Please call [phone]  "

## 2017-09-28 ENCOUNTER — TELEPHONE (OUTPATIENT)
Dept: SLEEP MEDICINE | Facility: CLINIC | Age: 52
End: 2017-09-28

## 2017-09-28 NOTE — TELEPHONE ENCOUNTER
Ana,    Please inform the patient that he should call the DME to get the new mask.  They do not have self cleaning CPAp machines, he may have meant So-cleans - the machine that cleans CPAP machines. They are available for sale in [place] - not covered by insurance and 300$    TY!    obroonst      Thanks!

## 2017-09-28 NOTE — TELEPHONE ENCOUNTER
----- Message from Gina Anthony MD sent at 9/25/2017  8:31 PM CDT -----  WHITNEY Perez MD  Caller: pt       Previous Messages      ----- Message -----   From: Kurtis Bynum MA   Sent: 9/22/2017   3:49 PM   To: WHITNEY Perez Dr. needs to place a new order for a machine.   ----- Message -----   From: Lary Vines   Sent: 9/22/2017   1:54 PM   To: Raj Fuentes Staff     x_  1st Request   _  2nd Request   _  3rd Request       Who:pt     Why: pt states that mask on C-pap machine is making his nose sore, and pt also states that he would like to change his machine to the self cleaning c-pap machine     What Number to Call Back: [phone]     When to Expect a call back: (Before the end of the day)   -- if call after 3:00 call back will be tomorrow.

## 2017-09-29 NOTE — TELEPHONE ENCOUNTER
Left a very detailed message informing pt to call [facility] regarding getting new mask and where he can buy the so clean CpAP machine with contact info

## 2018-01-29 RX ORDER — DESMOPRESSIN ACETATE 0.1 MG/1
100 TABLET ORAL 2 TIMES DAILY
Qty: 60 TABLET | Refills: 0 | OUTPATIENT
Start: 2018-01-29

## 2018-02-21 RX ORDER — OMEPRAZOLE 40 MG/1
40 CAPSULE, DELAYED RELEASE ORAL DAILY
Qty: 30 CAPSULE | Refills: 11 | Status: SHIPPED | OUTPATIENT
Start: 2018-02-21

## 2018-03-12 RX ORDER — DESMOPRESSIN ACETATE 0.1 MG/1
100 TABLET ORAL 2 TIMES DAILY
Qty: 60 TABLET | Refills: 0 | OUTPATIENT
Start: 2018-03-12